# Patient Record
Sex: FEMALE | Race: ASIAN | NOT HISPANIC OR LATINO | Employment: FULL TIME | URBAN - METROPOLITAN AREA
[De-identification: names, ages, dates, MRNs, and addresses within clinical notes are randomized per-mention and may not be internally consistent; named-entity substitution may affect disease eponyms.]

---

## 2018-02-23 ENCOUNTER — OFFICE VISIT CONVERTED (OUTPATIENT)
Dept: FAMILY MEDICINE CLINIC | Facility: CLINIC | Age: 38
End: 2018-02-23
Attending: NURSE PRACTITIONER

## 2018-03-09 ENCOUNTER — OFFICE VISIT CONVERTED (OUTPATIENT)
Dept: SURGERY | Facility: CLINIC | Age: 38
End: 2018-03-09
Attending: SURGERY

## 2018-04-24 ENCOUNTER — OFFICE VISIT CONVERTED (OUTPATIENT)
Dept: FAMILY MEDICINE CLINIC | Facility: CLINIC | Age: 38
End: 2018-04-24
Attending: NURSE PRACTITIONER

## 2019-02-05 ENCOUNTER — OFFICE VISIT CONVERTED (OUTPATIENT)
Dept: FAMILY MEDICINE CLINIC | Facility: CLINIC | Age: 39
End: 2019-02-05
Attending: NURSE PRACTITIONER

## 2019-02-05 ENCOUNTER — HOSPITAL ENCOUNTER (OUTPATIENT)
Dept: FAMILY MEDICINE CLINIC | Facility: CLINIC | Age: 39
Discharge: HOME | End: 2019-02-05

## 2019-02-05 LAB
ALBUMIN SERPL-MCNC: 4.3 G/DL (ref 3.5–5)
ALBUMIN/GLOB SERPL: 1.5 {RATIO} (ref 1.4–2.6)
ALP SERPL-CCNC: 40 U/L (ref 42–98)
ALT SERPL-CCNC: 6 U/L (ref 10–40)
ANION GAP SERPL CALC-SCNC: 16 MMOL/L (ref 8–19)
APPEARANCE UR: CLEAR
AST SERPL-CCNC: 13 U/L (ref 15–50)
BASOPHILS # BLD AUTO: 0.07 10*3/UL (ref 0–0.2)
BASOPHILS NFR BLD AUTO: 1.19 % (ref 0–3)
BILIRUB SERPL-MCNC: 0.43 MG/DL (ref 0.2–1.3)
BILIRUB UR QL: NEGATIVE
BUN SERPL-MCNC: 7 MG/DL (ref 5–25)
BUN/CREAT SERPL: 13 {RATIO} (ref 6–20)
CALCIUM SERPL-MCNC: 9.1 MG/DL (ref 8.7–10.4)
CHLORIDE SERPL-SCNC: 106 MMOL/L (ref 99–111)
COLOR UR: YELLOW
CONV BACTERIA: ABNORMAL
CONV CO2: 24 MMOL/L (ref 22–32)
CONV COLLECTION SOURCE (UA): ABNORMAL
CONV HYALINE CASTS IN URINE MICRO: ABNORMAL /[LPF]
CONV TOTAL PROTEIN: 7.2 G/DL (ref 6.3–8.2)
CONV UROBILINOGEN IN URINE BY AUTOMATED TEST STRIP: 0.2 {EHRLICHU}/DL (ref 0.1–1)
CREAT UR-MCNC: 0.52 MG/DL (ref 0.5–0.9)
EOSINOPHIL # BLD AUTO: 0.4 10*3/UL (ref 0–0.7)
EOSINOPHIL # BLD AUTO: 7.01 % (ref 0–7)
ERYTHROCYTE [DISTWIDTH] IN BLOOD BY AUTOMATED COUNT: 13.3 % (ref 11.5–14.5)
GFR SERPLBLD BASED ON 1.73 SQ M-ARVRAT: >60 ML/MIN/{1.73_M2}
GLOBULIN UR ELPH-MCNC: 2.9 G/DL (ref 2–3.5)
GLUCOSE SERPL-MCNC: 64 MG/DL (ref 65–99)
GLUCOSE UR QL: NEGATIVE MG/DL
HBA1C MFR BLD: 11.2 G/DL (ref 12–16)
HCT VFR BLD AUTO: 33.9 % (ref 37–47)
HGB UR QL STRIP: NEGATIVE
KETONES UR QL STRIP: NEGATIVE MG/DL
LEUKOCYTE ESTERASE UR QL STRIP: ABNORMAL
LYMPHOCYTES # BLD AUTO: 1.84 10*3/UL (ref 1–5)
MCH RBC QN AUTO: 27.8 PG (ref 27–31)
MCHC RBC AUTO-ENTMCNC: 33 G/DL (ref 33–37)
MCV RBC AUTO: 84.1 FL (ref 81–99)
MONOCYTES # BLD AUTO: 0.5 10*3/UL (ref 0.2–1.2)
MONOCYTES NFR BLD AUTO: 8.7 % (ref 3–10)
NEUTROPHILS # BLD AUTO: 2.94 10*3/UL (ref 2–8)
NEUTROPHILS NFR BLD AUTO: 51.1 % (ref 30–85)
NITRITE UR QL STRIP: NEGATIVE
NRBC BLD AUTO-RTO: 0 % (ref 0–0.01)
OSMOLALITY SERPL CALC.SUM OF ELEC: 290 MOSM/KG (ref 273–304)
PH UR STRIP.AUTO: 5.5 [PH] (ref 5–8)
PLATELET # BLD AUTO: 249 10*3/UL (ref 130–400)
PMV BLD AUTO: 8.2 FL (ref 7.4–10.4)
POTASSIUM SERPL-SCNC: 4.3 MMOL/L (ref 3.5–5.3)
PROT UR QL: NEGATIVE MG/DL
RBC # BLD AUTO: 4.03 10*6/UL (ref 4.2–5.4)
RBC #/AREA URNS HPF: ABNORMAL /[HPF]
SODIUM SERPL-SCNC: 142 MMOL/L (ref 135–147)
SP GR UR: 1.02 (ref 1–1.03)
SQUAMOUS SPT QL MICRO: ABNORMAL /[HPF]
VARIANT LYMPHS NFR BLD MANUAL: 32 % (ref 20–45)
WBC # BLD AUTO: 5.76 10*3/UL (ref 4.8–10.8)
WBC #/AREA URNS HPF: ABNORMAL /[HPF]

## 2019-02-07 LAB — BACTERIA UR CULT: NORMAL

## 2019-02-20 ENCOUNTER — HOSPITAL ENCOUNTER (OUTPATIENT)
Dept: OTHER | Facility: HOSPITAL | Age: 39
Discharge: HOME | End: 2019-02-20

## 2019-03-14 ENCOUNTER — HOSPITAL ENCOUNTER (OUTPATIENT)
Dept: CT IMAGING | Facility: HOSPITAL | Age: 39
Discharge: HOME | End: 2019-03-14

## 2019-06-04 ENCOUNTER — HOSPITAL ENCOUNTER (OUTPATIENT)
Dept: MAMMOGRAPHY | Facility: HOSPITAL | Age: 39
Discharge: HOME | End: 2019-06-04
Attending: OBSTETRICS & GYNECOLOGY

## 2019-06-12 ENCOUNTER — HOSPITAL ENCOUNTER (OUTPATIENT)
Dept: URGENT CARE | Facility: CLINIC | Age: 39
Discharge: HOME | End: 2019-06-12

## 2019-07-11 ENCOUNTER — OFFICE VISIT CONVERTED (OUTPATIENT)
Dept: FAMILY MEDICINE CLINIC | Facility: CLINIC | Age: 39
End: 2019-07-11
Attending: FAMILY MEDICINE

## 2019-07-24 ENCOUNTER — HOSPITAL ENCOUNTER (OUTPATIENT)
Dept: CT IMAGING | Facility: HOSPITAL | Age: 39
Discharge: HOME | End: 2019-07-24
Attending: FAMILY MEDICINE

## 2019-07-25 ENCOUNTER — OFFICE VISIT CONVERTED (OUTPATIENT)
Dept: FAMILY MEDICINE CLINIC | Facility: CLINIC | Age: 39
End: 2019-07-25
Attending: FAMILY MEDICINE

## 2019-08-14 ENCOUNTER — OFFICE VISIT CONVERTED (OUTPATIENT)
Dept: FAMILY MEDICINE CLINIC | Facility: CLINIC | Age: 39
End: 2019-08-14
Attending: FAMILY MEDICINE

## 2019-08-26 ENCOUNTER — HOSPITAL ENCOUNTER (OUTPATIENT)
Dept: OTHER | Facility: HOSPITAL | Age: 39
Setting detail: RECURRING SERIES
Discharge: HOME | End: 2019-10-24
Attending: FAMILY MEDICINE

## 2019-09-11 ENCOUNTER — OFFICE VISIT CONVERTED (OUTPATIENT)
Dept: FAMILY MEDICINE CLINIC | Facility: CLINIC | Age: 39
End: 2019-09-11
Attending: FAMILY MEDICINE

## 2019-09-25 ENCOUNTER — OFFICE VISIT CONVERTED (OUTPATIENT)
Dept: FAMILY MEDICINE CLINIC | Facility: CLINIC | Age: 39
End: 2019-09-25
Attending: FAMILY MEDICINE

## 2019-10-10 ENCOUNTER — OFFICE VISIT CONVERTED (OUTPATIENT)
Dept: FAMILY MEDICINE CLINIC | Facility: CLINIC | Age: 39
End: 2019-10-10
Attending: FAMILY MEDICINE

## 2019-10-22 ENCOUNTER — OFFICE VISIT CONVERTED (OUTPATIENT)
Dept: FAMILY MEDICINE CLINIC | Facility: CLINIC | Age: 39
End: 2019-10-22
Attending: FAMILY MEDICINE

## 2019-11-12 ENCOUNTER — OFFICE VISIT CONVERTED (OUTPATIENT)
Dept: FAMILY MEDICINE CLINIC | Facility: CLINIC | Age: 39
End: 2019-11-12
Attending: FAMILY MEDICINE

## 2019-12-16 ENCOUNTER — HOSPITAL ENCOUNTER (OUTPATIENT)
Dept: URGENT CARE | Facility: CLINIC | Age: 39
Discharge: HOME | End: 2019-12-16
Attending: EMERGENCY MEDICINE

## 2019-12-17 ENCOUNTER — OFFICE VISIT CONVERTED (OUTPATIENT)
Dept: FAMILY MEDICINE CLINIC | Facility: CLINIC | Age: 39
End: 2019-12-17
Attending: FAMILY MEDICINE

## 2020-01-24 ENCOUNTER — HOSPITAL ENCOUNTER (OUTPATIENT)
Dept: MRI IMAGING | Facility: HOSPITAL | Age: 40
Discharge: HOME | End: 2020-01-24
Attending: FAMILY MEDICINE

## 2020-02-05 ENCOUNTER — OFFICE VISIT CONVERTED (OUTPATIENT)
Dept: SURGERY | Facility: CLINIC | Age: 40
End: 2020-02-05
Attending: SURGERY

## 2020-02-19 ENCOUNTER — OFFICE VISIT CONVERTED (OUTPATIENT)
Dept: FAMILY MEDICINE CLINIC | Facility: CLINIC | Age: 40
End: 2020-02-19
Attending: FAMILY MEDICINE

## 2020-03-26 ENCOUNTER — HOSPITAL ENCOUNTER (OUTPATIENT)
Dept: URGENT CARE | Facility: CLINIC | Age: 40
Discharge: HOME | End: 2020-03-26
Attending: EMERGENCY MEDICINE

## 2020-03-28 LAB — BACTERIA SPEC AEROBE CULT: NORMAL

## 2020-06-16 ENCOUNTER — OFFICE VISIT CONVERTED (OUTPATIENT)
Dept: FAMILY MEDICINE CLINIC | Facility: CLINIC | Age: 40
End: 2020-06-16
Attending: FAMILY MEDICINE

## 2021-01-20 ENCOUNTER — HOSPITAL ENCOUNTER (OUTPATIENT)
Dept: URGENT CARE | Facility: CLINIC | Age: 41
Discharge: HOME | End: 2021-01-20
Attending: PHYSICIAN ASSISTANT

## 2021-01-21 LAB — SARS-COV-2 RNA SPEC QL NAA+PROBE: DETECTED

## 2021-02-01 ENCOUNTER — HOSPITAL ENCOUNTER (OUTPATIENT)
Dept: URGENT CARE | Facility: CLINIC | Age: 41
Discharge: HOME | End: 2021-02-01
Attending: EMERGENCY MEDICINE

## 2021-02-03 LAB — SARS-COV-2 RNA SPEC QL NAA+PROBE: NOT DETECTED

## 2021-05-13 NOTE — PROGRESS NOTES
Progress Note      Patient Name: Niecy Hooks   Patient ID: [id number]   Sex: Female   YOB: 1980    Primary Care Provider: Shawn Cheung DO   Referring Provider: Kimberly Rondon DO    Visit Date: 2020    Provider: Shawn Cheung DO   Location: [facility]   Location Address: [address] [place] [address], [address] [place] [address]  [id number]   Location Phone: [phone]          Chief Complaint  · rash      History Of Present Illness  Niecy Hooks is a 39 year old  female who presents for evaluation and treatment of:      Patient presents today for an acute visit.  She reports a rash that is been present that comes and goes that appears to be at any part of her body.  She reports that sometimes scratching she will noticed the rash more.  She does have photos that shows an erythematous raised plaque-like rash.  She denies any new detergents or any new products that could be causing this that she is applying to her skin.  She does use a fragrant lotion.  She denies any other symptoms.  Denies any fever chills.  She was concerned about COVID-19 so she got tested which was negative per patient report.       Past Medical History  *No Pertinent Past Medical History         Past Surgical History  Breast enlargement; Gallbladder; Thyroid surgery         Medication List  amitriptyline 50 mg oral tablet; Voltaren 1 % topical gel         Allergy List  NO KNOWN DRUG ALLERGIES         Family Medical History  Cancer, Unspecified         Reproductive History   2 Para 2 0 0 0       Social History  Alcohol (Never); Exercises regularly; Tobacco (Never)         Immunizations  Name Date Admin   Influenza          Review of Systems     General: Denies any fever, chills, weight changes, or night sweats  HEENT:  Denies any vision or hearing changes. Denies any neck tenderness. Denies any headaches. Denies nasal congestion  Rash as discussed above.  Respiratory: Denies any shortness of breath  "      Vitals  Date Time BP Position Site L\R Cuff Size HR RR TEMP (F) WT  HT  BMI kg/m2 BSA m2 O2 Sat HC       06/16/2020 04:27 /88 Sitting    89 - R  97.5 149lbs 2oz 5'  8\" 22.67 1.8 99 %          Physical Examination     General: AAO 3, no acute distress, pleasant  HEENT: Normocephalic, atraumatic  Cardiovascular: Regular rate and rhythm without appreciable murmur  Respiratory: Clear to auscultation bilaterally no RRW  Gastrointestinal: Soft nontender nondistended with bowel sounds present  Skin: Patient has scattered areas of erythematous raised plaque-like lesions noted on both arms.  They are less at this time compared to the photo she is showing me.  extremities: No clubbing, cyanosis or edema  Neurologic: CN II through XII grossly intact   Psychiatric: Normal mood and affect           Assessment  · Urticaria     708.9/L50.9    Problems Reconciled  Plan  · Orders  o ACO-39: Current medications updated and reviewed () - - 06/16/2020  o ALLERGY CONSULTATION (SHERING) - 708.9/L50.9 - 06/16/2020   new onset. please evaluate and treat  · Medications  o Claritin 10 mg oral tablet   SIG: take 1 tablet (10 mg) by oral route once daily for 90 days   DISP: (90) tablets with 1 refills  Prescribed on 06/16/2020     o famotidine 40 mg oral tablet   SIG: take 1 tablet (40 mg) by oral route daily   DISP: (90) tablets with 1 refills  Prescribed on 06/16/2020     o Medrol (Angel) 4 mg oral tablets,dose pack   SIG: take by oral route as directed per package instructions for 6 days   DISP: (1) 21 ct dose-pack with 0 refills  Prescribed on 06/16/2020     o Medications have been Reconciled  o Transition of Care or Provider Policy  · Instructions  o Patient was educated/instructed on their diagnosis, treatment and medications prior to discharge from the clinic today.  o Patient instructed to seek medical attention urgently for new or worsening symptoms.  o Call the office with any concerns or questions.  o I suspect " urticaria. I have discussed starting patient on Claritin, famotidine as well as a Medrol Dosepak. I will set her up with allergy to be evaluated for contact allergens. Patient to call with any questions or concerns. She is to return if there is any worsening symptoms or no improvement.  · Disposition  o Follow Up in 2 months.            Electronically Signed by: Shawn Cheung DO -Author on June 16, 2020 06:06:20 PM

## 2021-05-15 VITALS
TEMPERATURE: 98.7 F | DIASTOLIC BLOOD PRESSURE: 64 MMHG | HEIGHT: 68 IN | HEART RATE: 72 BPM | OXYGEN SATURATION: 98 % | SYSTOLIC BLOOD PRESSURE: 118 MMHG | BODY MASS INDEX: 21.67 KG/M2 | WEIGHT: 143 LBS

## 2021-05-15 VITALS
RESPIRATION RATE: 16 BRPM | SYSTOLIC BLOOD PRESSURE: 120 MMHG | HEIGHT: 68 IN | HEART RATE: 74 BPM | WEIGHT: 142.25 LBS | DIASTOLIC BLOOD PRESSURE: 80 MMHG | TEMPERATURE: 98 F | OXYGEN SATURATION: 99 % | BODY MASS INDEX: 21.56 KG/M2

## 2021-05-15 VITALS
WEIGHT: 139 LBS | TEMPERATURE: 98.1 F | DIASTOLIC BLOOD PRESSURE: 74 MMHG | BODY MASS INDEX: 21.07 KG/M2 | HEART RATE: 68 BPM | OXYGEN SATURATION: 98 % | SYSTOLIC BLOOD PRESSURE: 122 MMHG | HEIGHT: 68 IN

## 2021-05-15 VITALS
OXYGEN SATURATION: 99 % | BODY MASS INDEX: 21.14 KG/M2 | TEMPERATURE: 98 F | SYSTOLIC BLOOD PRESSURE: 110 MMHG | HEIGHT: 68 IN | HEART RATE: 69 BPM | DIASTOLIC BLOOD PRESSURE: 62 MMHG | WEIGHT: 139.5 LBS

## 2021-05-15 VITALS
OXYGEN SATURATION: 99 % | DIASTOLIC BLOOD PRESSURE: 74 MMHG | BODY MASS INDEX: 20.99 KG/M2 | SYSTOLIC BLOOD PRESSURE: 118 MMHG | HEART RATE: 98 BPM | WEIGHT: 138.5 LBS | HEIGHT: 68 IN | TEMPERATURE: 99 F

## 2021-05-15 VITALS
HEIGHT: 68 IN | SYSTOLIC BLOOD PRESSURE: 114 MMHG | HEART RATE: 82 BPM | OXYGEN SATURATION: 99 % | TEMPERATURE: 97.8 F | WEIGHT: 144 LBS | DIASTOLIC BLOOD PRESSURE: 62 MMHG | BODY MASS INDEX: 21.82 KG/M2

## 2021-05-15 VITALS
OXYGEN SATURATION: 99 % | BODY MASS INDEX: 22.6 KG/M2 | WEIGHT: 149.12 LBS | SYSTOLIC BLOOD PRESSURE: 134 MMHG | DIASTOLIC BLOOD PRESSURE: 88 MMHG | HEIGHT: 68 IN | HEART RATE: 89 BPM | TEMPERATURE: 97.5 F

## 2021-05-15 VITALS
WEIGHT: 139.12 LBS | HEART RATE: 86 BPM | DIASTOLIC BLOOD PRESSURE: 78 MMHG | SYSTOLIC BLOOD PRESSURE: 120 MMHG | TEMPERATURE: 98.2 F | BODY MASS INDEX: 21.08 KG/M2 | HEIGHT: 68 IN | OXYGEN SATURATION: 100 %

## 2021-05-15 VITALS
DIASTOLIC BLOOD PRESSURE: 78 MMHG | HEIGHT: 68 IN | SYSTOLIC BLOOD PRESSURE: 114 MMHG | BODY MASS INDEX: 21.98 KG/M2 | WEIGHT: 145 LBS | OXYGEN SATURATION: 98 % | TEMPERATURE: 98.3 F | HEART RATE: 96 BPM

## 2021-05-15 VITALS
SYSTOLIC BLOOD PRESSURE: 122 MMHG | DIASTOLIC BLOOD PRESSURE: 72 MMHG | TEMPERATURE: 98.1 F | HEART RATE: 72 BPM | BODY MASS INDEX: 20.97 KG/M2 | HEIGHT: 68 IN | OXYGEN SATURATION: 100 % | WEIGHT: 138.37 LBS

## 2021-05-15 VITALS — RESPIRATION RATE: 16 BRPM | BODY MASS INDEX: 21.22 KG/M2 | HEIGHT: 68 IN | WEIGHT: 140 LBS

## 2021-05-15 VITALS
DIASTOLIC BLOOD PRESSURE: 68 MMHG | WEIGHT: 141.25 LBS | BODY MASS INDEX: 21.41 KG/M2 | OXYGEN SATURATION: 99 % | SYSTOLIC BLOOD PRESSURE: 102 MMHG | TEMPERATURE: 97.9 F | HEART RATE: 58 BPM | HEIGHT: 68 IN

## 2021-05-15 VITALS
HEART RATE: 82 BPM | HEIGHT: 68 IN | OXYGEN SATURATION: 100 % | BODY MASS INDEX: 21.52 KG/M2 | SYSTOLIC BLOOD PRESSURE: 116 MMHG | TEMPERATURE: 98.7 F | WEIGHT: 142 LBS | DIASTOLIC BLOOD PRESSURE: 72 MMHG

## 2021-05-16 VITALS — WEIGHT: 145 LBS | HEIGHT: 68 IN | BODY MASS INDEX: 21.98 KG/M2 | RESPIRATION RATE: 16 BRPM

## 2021-05-16 VITALS
WEIGHT: 145 LBS | RESPIRATION RATE: 16 BRPM | OXYGEN SATURATION: 98 % | BODY MASS INDEX: 21.98 KG/M2 | HEIGHT: 68 IN | HEART RATE: 68 BPM | DIASTOLIC BLOOD PRESSURE: 69 MMHG | SYSTOLIC BLOOD PRESSURE: 121 MMHG

## 2021-05-16 VITALS
WEIGHT: 143.5 LBS | DIASTOLIC BLOOD PRESSURE: 70 MMHG | HEART RATE: 66 BPM | BODY MASS INDEX: 21.75 KG/M2 | TEMPERATURE: 98.3 F | OXYGEN SATURATION: 100 % | SYSTOLIC BLOOD PRESSURE: 120 MMHG | HEIGHT: 68 IN

## 2022-02-18 PROCEDURE — U0004 COV-19 TEST NON-CDC HGH THRU: HCPCS | Performed by: FAMILY MEDICINE

## 2022-05-28 PROCEDURE — U0004 COV-19 TEST NON-CDC HGH THRU: HCPCS | Performed by: PHYSICIAN ASSISTANT

## 2022-08-19 PROCEDURE — 87086 URINE CULTURE/COLONY COUNT: CPT

## 2022-10-20 ENCOUNTER — OFFICE VISIT (OUTPATIENT)
Dept: ORTHOPEDIC SURGERY | Facility: CLINIC | Age: 42
End: 2022-10-20

## 2022-10-20 VITALS — BODY MASS INDEX: 22.07 KG/M2 | TEMPERATURE: 97.8 F | RESPIRATION RATE: 14 BRPM | HEIGHT: 69 IN | WEIGHT: 149 LBS

## 2022-10-20 DIAGNOSIS — M72.2 PLANTAR FASCIITIS: Primary | ICD-10-CM

## 2022-10-20 PROCEDURE — 73630 X-RAY EXAM OF FOOT: CPT | Performed by: ORTHOPAEDIC SURGERY

## 2022-10-20 PROCEDURE — 99203 OFFICE O/P NEW LOW 30 MIN: CPT | Performed by: ORTHOPAEDIC SURGERY

## 2022-10-20 NOTE — PROGRESS NOTES
"  ORTHOPEDIC  NOTE         REQUESTING PHYSICIAN    No att. providers found     PRIMARY CARE PHYSICIAN    Provider, No Known    REASON FOR CONSULTATION    Ann Marie Hooks is a 42 y.o. female who was referred for consultation for   Chief Complaint   Patient presents with   • Right Foot - Pain, Initial Evaluation       HPI    Ann Marie Hooks is a pleasant 42 y.o. female who presents today for evaluation of a right heel pain. She has had difficulty bearing weight on the foot for approximately 12weeks. They state that it feels like \"a hot poker\" in their heel. It is significantly worse the first thing in the morning. A heating pad and stretching seem to really help but she is continue to try some YouTube exercises as well as ice bottle massage and some anti-inflammatories as well as some recent steroids given by urgent care but continues to be symptomatic.. Denies other injuries.       REVIEW OF SYSTEMS    See HPI for further details.   ROS:  Constitutional:  Denies fever, shaking or chills         All other systems reviewed and are negative.      MEDICAL HISTORY    Past Medical History:   Diagnosis Date   • Injury of back    • Sciatic nerve pain        SURGICAL HISTORY     has a past surgical history that includes Thyroid surgery; Cholecystectomy; and Breast surgery.    FAMILY HISTORY    Family History   Problem Relation Age of Onset   • Diabetes Paternal Grandfather        SOCIAL HISTORY    Social History     Socioeconomic History   • Marital status:    Tobacco Use   • Smoking status: Never   • Smokeless tobacco: Never   Vaping Use   • Vaping Use: Never used   Substance and Sexual Activity   • Alcohol use: Yes     Comment: occasion   • Drug use: Never   • Sexual activity: Defer       ALLERGIES    No Known Allergies    MEDICATIONS    Current Outpatient Medications   Medication Sig Dispense Refill   • diclofenac (VOLTAREN) 75 MG EC tablet Take 1 tablet by mouth 2 (Two) Times a Day. 30 tablet 0   • predniSONE " "(DELTASONE) 20 MG tablet Take 2 tablets by mouth Daily. 10 tablet 0     No current facility-administered medications for this visit.       PHYSICAL EXAM          Physical Exam:   Vital Signs:  Temp 97.8 °F (36.6 °C)   Resp 14   Ht 175.3 cm (69\")   Wt 67.6 kg (149 lb)   LMP 09/20/2022   BMI 22.00 kg/m²   Constitutional: Awake alert and oriented x3, well developed, well nourished, no acute distress, non-toxic appearance.      Musculoskeletal exam::    Exam of the right ankle and foot:  No atrophy, swelling, erythema, ecchymosis, or gross deformity noted  + tenderness to palpation over the plantar fascia insertion  Heel compression negative, no tenderness of achilles tendon  + flexible pes planus  Full range of motion  5/5 strength in ankle dorsiflexion, plantarflexion, inversion, and eversion  Ankle stable to anterior drawer testing  Talar tilt negative  No pain with passive inversion or eversion  Sensation grossly intact to light touch throughout  Skin intact  Palpable distal pulses    Exam of the contralateral ankle and foot is normal:  No atrophy, swelling, erythema, ecchymosis, or gross deformity noted  No tenderness to palpation or crepitus  Full range of motion  5/5 strength in ankle dorsiflexion, plantarflexion, inversion, and eversion  Ankle stable to anterior drawer testing  Talar tilt negative  No pain with passive inversion or eversion  Sensation grossly intact to light touch throughout  Skin intact  Palpable distal pulses          Diagnostic Studies:     Imaging was personally and individually reviewed and discussed at length with the patient:    3 views of the left foot were taken in the office today. There are no fractures or subluxations seen.  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  I have recommended a stretching " protocol, which was demonstrated at length in the office and a handout was supplied, for initial treatment.  I have also recommended an NSAID to help with the swelling and inflammation.  She also given a night splint as well as therapeutic orders.  Things or not improving in the next 4 to 6 weeks she is instructed to return recommend following my foot ankle partner if needed.        All questions were answered, the patient understands and agrees with the plan.

## 2022-11-22 ENCOUNTER — TREATMENT (OUTPATIENT)
Dept: PHYSICAL THERAPY | Facility: CLINIC | Age: 42
End: 2022-11-22

## 2022-11-22 DIAGNOSIS — R26.2 DIFFICULTY WALKING: ICD-10-CM

## 2022-11-22 DIAGNOSIS — M72.2 PLANTAR FASCIITIS OF RIGHT FOOT: Primary | ICD-10-CM

## 2022-11-22 DIAGNOSIS — M79.671 RIGHT FOOT PAIN: ICD-10-CM

## 2022-11-22 PROCEDURE — 97161 PT EVAL LOW COMPLEX 20 MIN: CPT | Performed by: PHYSICAL THERAPIST

## 2022-11-22 PROCEDURE — 97140 MANUAL THERAPY 1/> REGIONS: CPT | Performed by: PHYSICAL THERAPIST

## 2022-11-22 PROCEDURE — 97110 THERAPEUTIC EXERCISES: CPT | Performed by: PHYSICAL THERAPIST

## 2022-11-22 NOTE — PROGRESS NOTES
Physical Therapy Initial Evaluation and Plan of Care  [address] [facility], [address] [place] [address]        Patient: Ann Marie Hooks   : 1980  Diagnosis/ICD-10 Code:  Plantar fasciitis of right foot [M72.2]  Referring practitioner: Shawn Aguilar MD  Date of Initial Visit: 2022  Today's Date: 2022  Patient seen for 1 sessions           Subjective Questionnaire: LEFS: 47/80      Subjective Evaluation    History of Present Illness  Mechanism of injury: Pt reports that she has had R plantar fasciitis for several months with no particular DAYSI.  Pt reports that she has to walk long distances for work and has to wear steel toe boots for one job.  Pt reports that she has been fitted for orthotics which she wears at work and in her sneakers as well.  Pt reports that pain is worst in the morning and with long distance walking.  Pt reports that she is taking medication for pain which is helping some.  Pt reports work 12 hour shifts.     Pain  Current pain ratin  At best pain ratin  At worst pain rating: 10  Quality: sharp, tight, knife-like and discomfort  Relieving factors: change in position, rest and medications  Aggravating factors: ambulation, prolonged positioning, movement and standing  Progression: worsening    Patient Goals  Patient goals for therapy: decreased pain, increased motion, increased strength, independence with ADLs/IADLs and return to sport/leisure activities             Objective          Tenderness   Left Ankle/Foot   Tenderness in the plantar fascia.     Right Ankle/Foot   Tenderness in the plantar fascia.     Additional Tenderness Details  significant tightness of R plantar fascia noted, mild tightness in L plantar fascia    Active Range of Motion   Left Ankle/Foot   Dorsiflexion (ke): 8 degrees   Plantar flexion: WFL  Inversion: WFL  Eversion: WFL    Right Ankle/Foot   Dorsiflexion (ke): 3 degrees   Plantar flexion: WFL  Inversion: WFL  Eversion: WFL    Strength/Myotome  Testing     Left Hip   Planes of Motion   Flexion: 4+  Extension: 4-    Right Hip   Planes of Motion   Flexion: 4+  Extension: 4-    Left Knee   Flexion: 4+  Extension: 4+    Right Knee   Flexion: 4+  Extension: 4+    Left Ankle/Foot   Dorsiflexion: 4  Plantar flexion: 4-  Inversion: 4  Eversion: 4    Right Ankle/Foot   Dorsiflexion: 4  Plantar flexion: 4-  Inversion: 4  Eversion: 4    Tests   Left Ankle/Foot   Negative for windlass.     Right Ankle/Foot   Positive for windlass.     Ambulation     Observational Gait   Gait: antalgic   Walking speed and stride length within functional limits.   Base of support: normal    Additional Observational Gait Details  Pt demonstrates mild antalgic gait on the R LE with decreased stance time, heel strike and push off.      General Comments     Ankle/Foot Comments   Light touch sensation normal in bilateral LEs           Assessment & Plan     Assessment  Impairments: abnormal gait, abnormal muscle firing, abnormal or restricted ROM, activity intolerance, impaired balance, impaired physical strength, lacks appropriate home exercise program, pain with function, safety issue and weight-bearing intolerance  Functional Limitations: walking, uncomfortable because of pain, standing and unable to perform repetitive tasks  Assessment details: Patient presents with signs/symptoms consistent with right plantar fasciitis including decreased bilateral ankle DF ROM, bilateral hip and ankle weakness, plantar fascia tightness bilaterally (worse on R), positive windlass test and reports of pain limiting function during ADLs as shown on the LEFS.  Patient would benefit from skilled PT services in order to address deficits limiting function at this time.  HEP was given to patient this session and education on HEP/diagnosis provided to patient.      Prognosis: good    Goals  Plan Goals: 1. The patient has limited ROM for the R ankle.   LTG 1: 12 weeks:  In order to allow the patient greater ease  with forward, lateral, and diagonal mobility the patient will demonstrate improved ROM of the bilateral ankle as follows:  10 degrees of dorsiflexion.  STATUS:  New    2. The patient has limited strength of the R ankle.   LTG 2: 12 weeks:  In order to provide greater joint stability of the bilateral ankle the patient will demonstrate improved strength of the bilateral ankles as follows:  5/5 dorsiflexion, 5/5 inversion, 5/5 eversion, and 4+/5 plantar flexion.    STATUS:  New   STG 2a: 6 weeks:  The patient will demonstrate improved strength of the bilateral ankle as follows:  4+/5 dorsiflexion, 4+/5 inversion, 4+/5 eversion, and 4/5 plantar flexion.    STATUS:  New    3. The patient has gait dysfunction.   LTG 3: 12 weeks:  The patient will ambulate without assistive device, independently, for community distances with minimal limp to the R lower extremity in order to improve mobility and allow patient to perform activities such as grocery shopping with greater ease.    STATUS:  New   STG 3a: The patient will be independent in HEP.    STATUS:  New    4. The patient complains of R ankle pain.  LTG 4: 12 weeks:  The patient will report a pain rating of 1/10 or better in order to improve  tolerance to activities of daily living and improve sleep quality.  STATUS:  New  STG 4a: 6 weeks:  The patient will report a pain rating of 3/10 or better.  STATUS:  New    5. Mobility: Walking/Moving Around Functional Limitation     LTG 5: 12 weeks:  The patient will demonstrate 1-19% limitation by achieving a score of 70 on the Lower Extremity Functional Scale.    STATUS:  New   STG 5a: 6 weeks:  The patient will demonstrate 25% limitation by achieving a score of 60 on the Lower Extremity Functional Scale.      STATUS:  New        Plan  Therapy options: will be seen for skilled therapy services  Planned modality interventions: cryotherapy, TENS and thermotherapy (hydrocollator packs)  Planned therapy interventions:  balance/weight-bearing training, ADL retraining, soft tissue mobilization, strengthening, stretching, therapeutic activities, joint mobilization, home exercise program, gait training, functional ROM exercises, flexibility, body mechanics training, postural training, neuromuscular re-education and manual therapy  Frequency: 2x week  Duration in weeks: 12  Treatment plan discussed with: patient        Timed:         Manual Therapy:    8     mins  31476;     Therapeutic Exercise:    8     mins  21699;     Neuromuscular Cuco:    0    mins  36985;    Therapeutic Activity:     0     mins  76169;     Gait Trainin     mins  05997;     Ultrasound:     0     mins  49270;    Ionto                               0    mins   85582  Self-care  __0__ mins 29513    Un-Timed:  Electrical Stimulation:    0     mins  99397 ( );  Traction     0     mins 75501  Low Eval     20     Mins  69134  Mod Eval     0     Mins  56883  High Eval                       0     Mins  88731  Hot pack     0     Mins    Cold pack                       0     Mins      Timed Treatment:   16   mins   Total Treatment:     36   mins    PT SIGNATURE: Courtney Sepulveda PT      Electronically signed 2022  [place] License: [id number]    Initial Certification    Certification Period: 2022 thru 2023  NPI: [id number]  I certify that the therapy services are furnished while this patient is under my care.  The services outlined above are required by this patient, and will be reviewed every 90 days.     PHYSICIAN: Shawn Aguilar MD      DATE:     Please sign and return via fax to [phone].. Thank you, [facility].

## 2022-12-02 ENCOUNTER — TREATMENT (OUTPATIENT)
Dept: PHYSICAL THERAPY | Facility: CLINIC | Age: 42
End: 2022-12-02

## 2022-12-02 DIAGNOSIS — R26.2 DIFFICULTY WALKING: ICD-10-CM

## 2022-12-02 DIAGNOSIS — M72.2 PLANTAR FASCIITIS OF RIGHT FOOT: Primary | ICD-10-CM

## 2022-12-02 DIAGNOSIS — M79.671 RIGHT FOOT PAIN: ICD-10-CM

## 2022-12-02 PROCEDURE — 97140 MANUAL THERAPY 1/> REGIONS: CPT | Performed by: PHYSICAL THERAPIST

## 2022-12-02 PROCEDURE — 97110 THERAPEUTIC EXERCISES: CPT | Performed by: PHYSICAL THERAPIST

## 2022-12-02 PROCEDURE — 97530 THERAPEUTIC ACTIVITIES: CPT | Performed by: PHYSICAL THERAPIST

## 2022-12-02 NOTE — PROGRESS NOTES
Physical Therapy Daily Treatment Note  [address], [address], [place] [address]      Patient: Ann Marie Hooks   : 1980  Diagnosis/ICD-10 Code:  Plantar fasciitis of right foot [M72.2]  Referring practitioner: Shawn Aguilar MD  Date of Initial Visit: Type: THERAPY  Noted: 2022  Today's Date: 2022  Patient seen for 2 sessions             Subjective   Ann Marie Hooks reports: moderate R PF pain today.    Objective   See Exercise, Manual, and Modality Logs for complete treatment.       Assessment/Plan  Patient tolerated all exercise progressions and manual therapy well today but continues to demonstrate R ankle strength deficits and R PF tightness limiting function. Continue to progress per patient tolerance.     Progress per Plan of Care           Timed:  Manual Therapy:    23     mins  86048;  Therapeutic Exercise:    8     mins  16530;     Neuromuscular Cuco:    0    mins  04959;    Therapeutic Activity:     8     mins  68322;     Gait Trainin     mins  26497;     Ultrasound:     0     mins  83251;    Electrical Stimulation:    0     mins  33298;  Iontophoresis     0     mins  57110    Untimed:  Electrical Stimulation:    0     mins  78541 ( );  Mechanical Traction:    0     mins  18697;   Fluidotherapy     0     mins  79168  Hot pack     0     Mins    Cold pack                       0     Mins     Timed Treatment:   39   mins   Total Treatment:     39   mins        Courtney Sepulveda PT    Electronically signed [unfilled]  [place] License: [id number]  NPI number: [id number]

## 2022-12-08 ENCOUNTER — TREATMENT (OUTPATIENT)
Dept: PHYSICAL THERAPY | Facility: CLINIC | Age: 42
End: 2022-12-08

## 2022-12-08 DIAGNOSIS — M72.2 PLANTAR FASCIITIS OF RIGHT FOOT: Primary | ICD-10-CM

## 2022-12-08 DIAGNOSIS — M79.671 RIGHT FOOT PAIN: ICD-10-CM

## 2022-12-08 DIAGNOSIS — R26.2 DIFFICULTY WALKING: ICD-10-CM

## 2022-12-08 PROCEDURE — 97530 THERAPEUTIC ACTIVITIES: CPT | Performed by: PHYSICAL THERAPIST

## 2022-12-08 PROCEDURE — 97140 MANUAL THERAPY 1/> REGIONS: CPT | Performed by: PHYSICAL THERAPIST

## 2022-12-08 PROCEDURE — 97110 THERAPEUTIC EXERCISES: CPT | Performed by: PHYSICAL THERAPIST

## 2022-12-08 NOTE — PROGRESS NOTES
Physical Therapy Daily Treatment Note  [address], [address] [place] [address]        Patient: Ann Marie Hooks   : 1980  Diagnosis/ICD-10 Code:  Plantar fasciitis of right foot [M72.2]  Referring practitioner: Shawn Aguilar MD  Date of Initial Visit: Type: THERAPY  Noted: 2022  Today's Date: 2022  Patient seen for 3 sessions             Subjective   Ann Marie Hooks reports having 6/10 pain in her plantar surface of her right foot upon arrival today.    Objective       + Tightness/ Tenderness to palpation Right Medial Calcaneal region - along Plantar surface.    Right Ankle Strength:  Grossly 4-/5 - 4/5  Decreased eccentric control noted during resistive band exercise performance).    See Exercise and Manual Logs for complete treatment.       Assessment/Plan     Pt tolerated therapy session well - with progression of therapeutic exercises, CKC-Functional activities, and Manual therapy. She has improved, but continues to have deficits in  Right ankle/foot Soft tissue mobility,  Strength, and Stability; limiting function and ability to perform ADLs without pain at this time.   HEP updated with 4 way ankle  With red resistive band issued for use at home.  Written/Verbal instruction given.    Progress per Plan of Care           Timed:  Manual Therapy:    8     mins  35457;  Therapeutic Exercise:    23     mins  40186;     Neuromuscular Cuco:    0    mins  02875;    Therapeutic Activity:     8     mins  19457;     Gait Trainin     mins  42023;     Ultrasound:     0     mins  40475;    Electrical Stimulation:    0     mins  89808;  Iontophoresis     0     mins  71242    Untimed:  Electrical Stimulation:    0     mins  47815 (MC );  Mechanical Traction:    0     mins  20455;   Fluidotherapy     0     mins  06178  Hot pack     0     mins  53539  Cold pack     0     mins  25578    Timed Treatment:   39   mins   Total Treatment:     39   mins        Kia Barraza PTA  Physical Therapist  Assistant

## 2023-01-11 NOTE — PROGRESS NOTES
Chief Complaint  Establish Care (/), Plantar Fasciitis, Podiatry Referral , and skin irritation (Pt states her back has been itchy )    Subjective          Ann Marie Hooks presents to [facility]  Depression  Visit Type: initial  Patient presents with the following symptoms: depressed mood, excessive worry, feelings of hopelessness, irritability and nervousness/anxiety.  Patient is not experiencing: anhedonia, chest pain, choking sensation, compulsions, confusion, decreased concentration, dizziness, dry mouth, fatigue, feelings of worthlessness, hypersomnia, hyperventilation, impotence, insomnia, malaise, memory impairment, muscle tension, nausea, obsessions, palpitations, panic, psychomotor agitation, psychomotor retardation, restlessness, shortness of breath, suicidal ideas, suicidal planning, thoughts of death, weight gain and weight loss.  Exacerbated by:  overseas, struggling to be single mom. Has picked up second job just to be out of the house. Denies SI/HI.  Treatment tried: nothing      Rash  The affected locations include the chest. The rash is characterized by itchiness. Pertinent negatives include no anorexia, congestion, cough, diarrhea, eye pain, facial edema, fatigue, fever, joint pain, nail changes, rhinorrhea, shortness of breath, sore throat or vomiting.        Previous PCP:  Shawn Cheung DO  Specialist(s):   Flower Hendrix APRN [facility] [place]     COVID vaccine: states she is up to date - does not have card with her   TDAP: not up to date   Pneumonia vaccine: not of age  Shingles vaccine: not of age  Colon cancer screening: never  Mammogram: 6/4/2019- ordered; vasu espinoza @ [facility]   Pap Smear: 3/02/2023 apt with gyno for annual   DEXA/Bone Density: not of age      Plantar Fascitis:     Would like podiatry referral. Was getting injections.     Current Outpatient Medications   Medication Instructions   • triamcinolone  "(KENALOG) 0.1 % ointment 1 application, Topical, 2 Times Daily       The following portions of the patient's history were reviewed and updated as appropriate: allergies, current medications, past family history, past medical history, past social history, past surgical history, and problem list.    Objective   Vital Signs:   /76 (BP Location: Left arm, Patient Position: Sitting, Cuff Size: Adult)   Pulse 76   Temp 98 °F (36.7 °C) (Temporal)   Ht 175.3 cm (69\")   Wt 67.9 kg (149 lb 9.6 oz)   SpO2 100%   BMI 22.09 kg/m²     Wt Readings from Last 3 Encounters:   01/12/23 67.9 kg (149 lb 9.6 oz)   10/20/22 67.6 kg (149 lb)   10/15/22 65.8 kg (145 lb)     BP Readings from Last 3 Encounters:   01/12/23 115/76   11/21/22 125/80   10/15/22 108/74     Physical Exam  Skin:     Coloration: Skin is not jaundiced or pale.      Findings: Rash (chest) present. No bruising, erythema or lesion.        Appearance: No acute distress, well-nourished  Head: normocephalic, atraumatic  Eyes: extraocular movements intact, no scleral icterus, no conjunctival injection  Ears, Nose, and Throat: external ears normal, nares patent, moist mucous membranes  Cardiovascular: regular rate and rhythm. no murmurs, rubs, or gallops. no edema  Respiratory: breathing comfortably, symmetric chest rise, clear to auscultation bilaterally. No wheezes, rales, or rhonchi.  Neuro: alert and oriented to time, place, and person. Normal gait  Psych: normal mood and affect     Result Review :   The following data was reviewed by: BERNARD Douglas on 01/12/2023:  Common labs    Common Labs 1/12/23 1/12/23 1/12/23 1/12/23    1508 1508 1508 1508   Glucose  126 (A)     BUN  8     Creatinine  0.56 (A)     Sodium  138     Potassium  3.9     Chloride  102     Calcium  9.0     Albumin  4.6     Total Bilirubin  <0.2     Alkaline Phosphatase  62     AST (SGOT)  21     ALT (SGPT)  13     WBC 6.96      Hemoglobin 8.9 (A)      Hematocrit 30.3 (A)    "   Platelets 343      Total Cholesterol   150    Triglycerides   85    HDL Cholesterol   61 (A)    LDL Cholesterol    73    Hemoglobin A1C    5.20   (A) Abnormal value                   Lab Results   Component Value Date    SARSANTIGEN Not Detected 02/18/2022    COVID19 Not Detected 05/28/2022    RAPFLUA Negative 02/18/2022    RAPFLUB Negative 02/18/2022    RAPSCRN Negative 02/18/2022    BILIRUBINUR Negative 08/29/2022       Procedures        Assessment and Plan    Diagnoses and all orders for this visit:    1. Screening for lipid disorders (Primary)  -     Lipid Panel    2. Need for hepatitis C screening test  -     HCV Antibody Rfx To Qnt PCR  -     Interpretation:    3. Screening for thyroid disorder  -     TSH Rfx On Abnormal To Free T4    4. Plantar fasciitis  -     Ambulatory Referral to Podiatry    5. Annual physical exam  -     CBC & Differential  -     Comprehensive Metabolic Panel    6. Dermatitis  -     triamcinolone (KENALOG) 0.1 % ointment; Apply 1 application topically to the appropriate area as directed 2 (Two) Times a Day.  Dispense: 453.6 g; Refill: 0    7. Encounter for screening mammogram for malignant neoplasm of breast  -     Mammo Screening Digital Tomosynthesis Bilateral With CAD; Future    8. Encounter for immunization  -     Tdap Vaccine Greater Than or Equal To 8yo IM    9. Major depressive disorder, recurrent episode, mild degree (HCC)  Comments:  Nervous to start medications ; would like to trial genesight  Orders:  -     GeneSight - Swab,; Future    10. IFG (impaired fasting glucose)  -     Hemoglobin A1c; Future  -     Hemoglobin A1c    11. Anemia, unspecified type  -     Iron and TIBC; Future  -     Iron and TIBC      Advised on diet, physical activity, sunscreen, helmet, texting and driving, etc      There are no discontinued medications.       Follow Up   Return in about 1 year (around 1/12/2024) for Annual physical.  Patient was given instructions and counseling regarding her  condition or for health maintenance advice. Please see specific information pulled into the AVS if appropriate.       Rosaline Robertson, BERNARD  01/17/23  07:44 EST

## 2023-01-12 ENCOUNTER — OFFICE VISIT (OUTPATIENT)
Dept: INTERNAL MEDICINE | Facility: CLINIC | Age: 43
End: 2023-01-12
Payer: COMMERCIAL

## 2023-01-12 VITALS
WEIGHT: 149.6 LBS | HEART RATE: 76 BPM | DIASTOLIC BLOOD PRESSURE: 76 MMHG | OXYGEN SATURATION: 100 % | BODY MASS INDEX: 22.16 KG/M2 | SYSTOLIC BLOOD PRESSURE: 115 MMHG | TEMPERATURE: 98 F | HEIGHT: 69 IN

## 2023-01-12 DIAGNOSIS — Z13.29 SCREENING FOR THYROID DISORDER: ICD-10-CM

## 2023-01-12 DIAGNOSIS — D64.9 ANEMIA, UNSPECIFIED TYPE: ICD-10-CM

## 2023-01-12 DIAGNOSIS — R73.01 IFG (IMPAIRED FASTING GLUCOSE): ICD-10-CM

## 2023-01-12 DIAGNOSIS — Z00.00 ANNUAL PHYSICAL EXAM: ICD-10-CM

## 2023-01-12 DIAGNOSIS — L30.9 DERMATITIS: ICD-10-CM

## 2023-01-12 DIAGNOSIS — Z11.59 NEED FOR HEPATITIS C SCREENING TEST: ICD-10-CM

## 2023-01-12 DIAGNOSIS — Z13.220 SCREENING FOR LIPID DISORDERS: Primary | ICD-10-CM

## 2023-01-12 DIAGNOSIS — Z12.31 ENCOUNTER FOR SCREENING MAMMOGRAM FOR MALIGNANT NEOPLASM OF BREAST: ICD-10-CM

## 2023-01-12 DIAGNOSIS — M72.2 PLANTAR FASCIITIS: ICD-10-CM

## 2023-01-12 DIAGNOSIS — F33.0 MAJOR DEPRESSIVE DISORDER, RECURRENT EPISODE, MILD DEGREE: Chronic | ICD-10-CM

## 2023-01-12 DIAGNOSIS — Z23 ENCOUNTER FOR IMMUNIZATION: ICD-10-CM

## 2023-01-12 LAB
BASOPHILS # BLD AUTO: 0.06 10*3/MM3 (ref 0–0.2)
BASOPHILS NFR BLD AUTO: 0.9 % (ref 0–1.5)
DEPRECATED RDW RBC AUTO: 51 FL (ref 37–54)
EOSINOPHIL # BLD AUTO: 0.62 10*3/MM3 (ref 0–0.4)
EOSINOPHIL NFR BLD AUTO: 8.9 % (ref 0.3–6.2)
ERYTHROCYTE [DISTWIDTH] IN BLOOD BY AUTOMATED COUNT: 18.8 % (ref 12.3–15.4)
HCT VFR BLD AUTO: 30.3 % (ref 34–46.6)
HGB BLD-MCNC: 8.9 G/DL (ref 12–15.9)
IMM GRANULOCYTES # BLD AUTO: 0.02 10*3/MM3 (ref 0–0.05)
IMM GRANULOCYTES NFR BLD AUTO: 0.3 % (ref 0–0.5)
LYMPHOCYTES # BLD AUTO: 1.71 10*3/MM3 (ref 0.7–3.1)
LYMPHOCYTES NFR BLD AUTO: 24.6 % (ref 19.6–45.3)
MCH RBC QN AUTO: 22.2 PG (ref 26.6–33)
MCHC RBC AUTO-ENTMCNC: 29.4 G/DL (ref 31.5–35.7)
MCV RBC AUTO: 75.6 FL (ref 79–97)
MONOCYTES # BLD AUTO: 0.6 10*3/MM3 (ref 0.1–0.9)
MONOCYTES NFR BLD AUTO: 8.6 % (ref 5–12)
NEUTROPHILS NFR BLD AUTO: 3.95 10*3/MM3 (ref 1.7–7)
NEUTROPHILS NFR BLD AUTO: 56.7 % (ref 42.7–76)
NRBC BLD AUTO-RTO: 0 /100 WBC (ref 0–0.2)
PLATELET # BLD AUTO: 343 10*3/MM3 (ref 140–450)
PMV BLD AUTO: 10.6 FL (ref 6–12)
RBC # BLD AUTO: 4.01 10*6/MM3 (ref 3.77–5.28)
WBC NRBC COR # BLD: 6.96 10*3/MM3 (ref 3.4–10.8)

## 2023-01-12 PROCEDURE — 80050 GENERAL HEALTH PANEL: CPT | Performed by: NURSE PRACTITIONER

## 2023-01-12 PROCEDURE — 80061 LIPID PANEL: CPT | Performed by: NURSE PRACTITIONER

## 2023-01-12 PROCEDURE — 90715 TDAP VACCINE 7 YRS/> IM: CPT | Performed by: NURSE PRACTITIONER

## 2023-01-12 PROCEDURE — 84466 ASSAY OF TRANSFERRIN: CPT | Performed by: NURSE PRACTITIONER

## 2023-01-12 PROCEDURE — 83540 ASSAY OF IRON: CPT | Performed by: NURSE PRACTITIONER

## 2023-01-12 PROCEDURE — 86803 HEPATITIS C AB TEST: CPT | Performed by: NURSE PRACTITIONER

## 2023-01-12 PROCEDURE — 99396 PREV VISIT EST AGE 40-64: CPT | Performed by: NURSE PRACTITIONER

## 2023-01-12 PROCEDURE — 83036 HEMOGLOBIN GLYCOSYLATED A1C: CPT | Performed by: NURSE PRACTITIONER

## 2023-01-12 PROCEDURE — 90471 IMMUNIZATION ADMIN: CPT | Performed by: NURSE PRACTITIONER

## 2023-01-13 LAB
ALBUMIN SERPL-MCNC: 4.6 G/DL (ref 3.5–5.2)
ALBUMIN/GLOB SERPL: 1.7 G/DL
ALP SERPL-CCNC: 62 U/L (ref 39–117)
ALT SERPL W P-5'-P-CCNC: 13 U/L (ref 1–33)
ANION GAP SERPL CALCULATED.3IONS-SCNC: 7.1 MMOL/L (ref 5–15)
AST SERPL-CCNC: 21 U/L (ref 1–32)
BILIRUB SERPL-MCNC: <0.2 MG/DL (ref 0–1.2)
BUN SERPL-MCNC: 8 MG/DL (ref 6–20)
BUN/CREAT SERPL: 14.3 (ref 7–25)
CALCIUM SPEC-SCNC: 9 MG/DL (ref 8.6–10.5)
CHLORIDE SERPL-SCNC: 102 MMOL/L (ref 98–107)
CHOLEST SERPL-MCNC: 150 MG/DL (ref 0–200)
CO2 SERPL-SCNC: 28.9 MMOL/L (ref 22–29)
CREAT SERPL-MCNC: 0.56 MG/DL (ref 0.57–1)
EGFRCR SERPLBLD CKD-EPI 2021: 115.6 ML/MIN/1.73
GLOBULIN UR ELPH-MCNC: 2.7 GM/DL
GLUCOSE SERPL-MCNC: 126 MG/DL (ref 65–99)
HBA1C MFR BLD: 5.2 % (ref 4.8–5.6)
HDLC SERPL-MCNC: 61 MG/DL (ref 40–60)
IRON 24H UR-MRATE: 18 MCG/DL (ref 37–145)
IRON SATN MFR SERPL: 4 % (ref 20–50)
LDLC SERPL CALC-MCNC: 73 MG/DL (ref 0–100)
LDLC/HDLC SERPL: 1.18 {RATIO}
POTASSIUM SERPL-SCNC: 3.9 MMOL/L (ref 3.5–5.2)
PROT SERPL-MCNC: 7.3 G/DL (ref 6–8.5)
SODIUM SERPL-SCNC: 138 MMOL/L (ref 136–145)
TIBC SERPL-MCNC: 441 MCG/DL (ref 298–536)
TRANSFERRIN SERPL-MCNC: 296 MG/DL (ref 200–360)
TRIGL SERPL-MCNC: 85 MG/DL (ref 0–150)
TSH SERPL DL<=0.05 MIU/L-ACNC: 1.01 UIU/ML (ref 0.27–4.2)
VLDLC SERPL-MCNC: 16 MG/DL (ref 5–40)

## 2023-01-14 LAB
HCV AB S/CO SERPL IA: <0.1 S/CO RATIO (ref 0–0.9)
HCV AB SERPL QL IA: NORMAL

## 2023-01-17 PROBLEM — D64.9 ANEMIA: Status: ACTIVE | Noted: 2023-01-17

## 2023-01-17 PROBLEM — F33.0 MAJOR DEPRESSIVE DISORDER, RECURRENT EPISODE, MILD DEGREE: Status: ACTIVE | Noted: 2023-01-17

## 2023-01-17 PROBLEM — R73.01 IFG (IMPAIRED FASTING GLUCOSE): Status: ACTIVE | Noted: 2023-01-17

## 2023-01-18 ENCOUNTER — TELEPHONE (OUTPATIENT)
Dept: INTERNAL MEDICINE | Facility: CLINIC | Age: 43
End: 2023-01-18
Payer: COMMERCIAL

## 2023-01-18 DIAGNOSIS — D50.9 IRON DEFICIENCY ANEMIA, UNSPECIFIED IRON DEFICIENCY ANEMIA TYPE: Primary | ICD-10-CM

## 2023-01-18 NOTE — TELEPHONE ENCOUNTER
ATTEMPTED TO CONTACT PATIENT. VERIFIED .     HUB OK TO READ/ADVISE:    Iron low. I am sending in iron supplement to take daily.   I also recommend rechecking in 3 months. Please schedule appt.       PLEASE SCHEDULE PATIENT FOR 3 MONTH FOLLOW-UP FOR IRON DEFICIENCY

## 2023-01-18 NOTE — TELEPHONE ENCOUNTER
----- Message from BERNARD Douglas sent at 1/18/2023  7:51 AM EST -----  Iron low. I am sending in iron supplement to take daily.   I also recommend rechecking in 3 months. Please schedule appt.

## 2023-02-18 ENCOUNTER — APPOINTMENT (OUTPATIENT)
Dept: ULTRASOUND IMAGING | Facility: HOSPITAL | Age: 43
End: 2023-02-18
Payer: COMMERCIAL

## 2023-02-18 ENCOUNTER — HOSPITAL ENCOUNTER (EMERGENCY)
Facility: HOSPITAL | Age: 43
Discharge: HOME | End: 2023-02-18
Attending: STUDENT IN AN ORGANIZED HEALTH CARE EDUCATION/TRAINING PROGRAM | Admitting: STUDENT IN AN ORGANIZED HEALTH CARE EDUCATION/TRAINING PROGRAM
Payer: COMMERCIAL

## 2023-02-18 VITALS
SYSTOLIC BLOOD PRESSURE: 135 MMHG | OXYGEN SATURATION: 100 % | WEIGHT: 149.69 LBS | TEMPERATURE: 98.2 F | DIASTOLIC BLOOD PRESSURE: 80 MMHG | BODY MASS INDEX: 22.69 KG/M2 | HEIGHT: 68 IN | HEART RATE: 61 BPM | RESPIRATION RATE: 16 BRPM

## 2023-02-18 DIAGNOSIS — R93.89 THICKENED ENDOMETRIUM: ICD-10-CM

## 2023-02-18 DIAGNOSIS — N83.201 RIGHT OVARIAN CYST: Primary | ICD-10-CM

## 2023-02-18 LAB
ALBUMIN SERPL-MCNC: 4.2 G/DL (ref 3.5–5.2)
ALBUMIN/GLOB SERPL: 1.6 G/DL
ALP SERPL-CCNC: 54 U/L (ref 39–117)
ALT SERPL W P-5'-P-CCNC: 35 U/L (ref 1–33)
ANION GAP SERPL CALCULATED.3IONS-SCNC: 8.3 MMOL/L (ref 5–15)
ANISOCYTOSIS BLD QL: NORMAL
AST SERPL-CCNC: 37 U/L (ref 1–32)
BASOPHILS # BLD AUTO: 0.06 10*3/MM3 (ref 0–0.2)
BASOPHILS NFR BLD AUTO: 1 % (ref 0–1.5)
BILIRUB SERPL-MCNC: 0.3 MG/DL (ref 0–1.2)
BILIRUB UR QL STRIP: NEGATIVE
BUN SERPL-MCNC: 8 MG/DL (ref 6–20)
BUN/CREAT SERPL: 17 (ref 7–25)
CALCIUM SPEC-SCNC: 9.1 MG/DL (ref 8.6–10.5)
CHLORIDE SERPL-SCNC: 103 MMOL/L (ref 98–107)
CLARITY UR: CLEAR
CO2 SERPL-SCNC: 25.7 MMOL/L (ref 22–29)
COLOR UR: YELLOW
CREAT SERPL-MCNC: 0.47 MG/DL (ref 0.57–1)
DEPRECATED RDW RBC AUTO: 62.8 FL (ref 37–54)
EGFRCR SERPLBLD CKD-EPI 2021: 120.6 ML/MIN/1.73
EOSINOPHIL # BLD AUTO: 0.53 10*3/MM3 (ref 0–0.4)
EOSINOPHIL NFR BLD AUTO: 8.4 % (ref 0.3–6.2)
ERYTHROCYTE [DISTWIDTH] IN BLOOD BY AUTOMATED COUNT: 23.1 % (ref 12.3–15.4)
GLOBULIN UR ELPH-MCNC: 2.7 GM/DL
GLUCOSE SERPL-MCNC: 95 MG/DL (ref 65–99)
GLUCOSE UR STRIP-MCNC: NEGATIVE MG/DL
HCG INTACT+B SERPL-ACNC: <0.5 MIU/ML
HCT VFR BLD AUTO: 33.4 % (ref 34–46.6)
HGB BLD-MCNC: 10.5 G/DL (ref 12–15.9)
HGB UR QL STRIP.AUTO: NEGATIVE
HOLD SPECIMEN: NORMAL
HOLD SPECIMEN: NORMAL
HYPOCHROMIA BLD QL: NORMAL
IMM GRANULOCYTES # BLD AUTO: 0.02 10*3/MM3 (ref 0–0.05)
IMM GRANULOCYTES NFR BLD AUTO: 0.3 % (ref 0–0.5)
KETONES UR QL STRIP: NEGATIVE
LEUKOCYTE ESTERASE UR QL STRIP.AUTO: NEGATIVE
LIPASE SERPL-CCNC: 32 U/L (ref 13–60)
LYMPHOCYTES # BLD AUTO: 1.57 10*3/MM3 (ref 0.7–3.1)
LYMPHOCYTES NFR BLD AUTO: 25 % (ref 19.6–45.3)
MCH RBC QN AUTO: 24.4 PG (ref 26.6–33)
MCHC RBC AUTO-ENTMCNC: 31.4 G/DL (ref 31.5–35.7)
MCV RBC AUTO: 77.5 FL (ref 79–97)
MICROCYTES BLD QL: NORMAL
MONOCYTES # BLD AUTO: 0.51 10*3/MM3 (ref 0.1–0.9)
MONOCYTES NFR BLD AUTO: 8.1 % (ref 5–12)
NEUTROPHILS NFR BLD AUTO: 3.6 10*3/MM3 (ref 1.7–7)
NEUTROPHILS NFR BLD AUTO: 57.2 % (ref 42.7–76)
NITRITE UR QL STRIP: NEGATIVE
NRBC BLD AUTO-RTO: 0 /100 WBC (ref 0–0.2)
PH UR STRIP.AUTO: 5.5 [PH] (ref 5–8)
PLATELET # BLD AUTO: 332 10*3/MM3 (ref 140–450)
PMV BLD AUTO: 9 FL (ref 6–12)
POTASSIUM SERPL-SCNC: 3.7 MMOL/L (ref 3.5–5.2)
PROT SERPL-MCNC: 6.9 G/DL (ref 6–8.5)
PROT UR QL STRIP: NEGATIVE
RBC # BLD AUTO: 4.31 10*6/MM3 (ref 3.77–5.28)
SMALL PLATELETS BLD QL SMEAR: ADEQUATE
SODIUM SERPL-SCNC: 137 MMOL/L (ref 136–145)
SP GR UR STRIP: <=1.005 (ref 1–1.03)
UROBILINOGEN UR QL STRIP: NORMAL
WBC MORPH BLD: NORMAL
WBC NRBC COR # BLD: 6.29 10*3/MM3 (ref 3.4–10.8)
WHOLE BLOOD HOLD COAG: NORMAL
WHOLE BLOOD HOLD SPECIMEN: NORMAL

## 2023-02-18 PROCEDURE — 85007 BL SMEAR W/DIFF WBC COUNT: CPT | Performed by: STUDENT IN AN ORGANIZED HEALTH CARE EDUCATION/TRAINING PROGRAM

## 2023-02-18 PROCEDURE — 81003 URINALYSIS AUTO W/O SCOPE: CPT

## 2023-02-18 PROCEDURE — 76830 TRANSVAGINAL US NON-OB: CPT

## 2023-02-18 PROCEDURE — 83690 ASSAY OF LIPASE: CPT | Performed by: STUDENT IN AN ORGANIZED HEALTH CARE EDUCATION/TRAINING PROGRAM

## 2023-02-18 PROCEDURE — 80053 COMPREHEN METABOLIC PANEL: CPT | Performed by: STUDENT IN AN ORGANIZED HEALTH CARE EDUCATION/TRAINING PROGRAM

## 2023-02-18 PROCEDURE — 36415 COLL VENOUS BLD VENIPUNCTURE: CPT

## 2023-02-18 PROCEDURE — 99283 EMERGENCY DEPT VISIT LOW MDM: CPT

## 2023-02-18 PROCEDURE — 85025 COMPLETE CBC W/AUTO DIFF WBC: CPT | Performed by: STUDENT IN AN ORGANIZED HEALTH CARE EDUCATION/TRAINING PROGRAM

## 2023-02-18 PROCEDURE — 84702 CHORIONIC GONADOTROPIN TEST: CPT | Performed by: STUDENT IN AN ORGANIZED HEALTH CARE EDUCATION/TRAINING PROGRAM

## 2023-02-18 RX ORDER — SODIUM CHLORIDE 0.9 % (FLUSH) 0.9 %
10 SYRINGE (ML) INJECTION AS NEEDED
Status: DISCONTINUED | OUTPATIENT
Start: 2023-02-18 | End: 2023-02-18 | Stop reason: HOSPADM

## 2023-02-18 NOTE — ED PROVIDER NOTES
Time: 12:43 PM EST  Date of encounter:  2/18/2023  Independent Historian/Clinical History and Information was obtained by: Patient and Chart  Chief Complaint: abdominal pain  History is limited by: N/A  Room number: 58/58     History of Present Illness:    History provided by:  Patient   used: No      Patient is a 42 y.o. year old female who presents to the Emergency Department via private car for evaluation of abdominal pain. Patient has no one at bedside on initial evaluation. Patient has a medical history of back pain. Patient has a surgical history of thyroid surgery and cholecystectomy. Patient no significant social histories.    Patient was evaluated and treated by Urgent Care (02/17/2023) for similar symptoms. Patient was discharged with over-the-counter pain medications.    Patient started experiencing symptoms of abdominal pain to the right lower quadrant with dysuria that started approximately 3-4 months ago and after each menstrual period. Patient states they are in moderate pain and rates their pain level 7 out of 10 at rest and with movement or activity. Patient states symptoms worsen after menstrual period. Patient denies all other symptoms. All other systems reviews are negative.    Patient tried prescription medication(s) with no relief to symptoms. Patient denies chance of pregnancy. Patient states she was diagnosed with UTI but not prescribed any antibiotics to treat it by her PCP.    Patient Care Team  Primary Care Provider: Rosaline Robertson APRN    Past Medical History:    No Known Allergies  Past Medical History:   Diagnosis Date   • Injury of back    • Sciatic nerve pain      Past Surgical History:   Procedure Laterality Date   • BREAST SURGERY      implants   • CHOLECYSTECTOMY     • THYROID SURGERY       Family History   Problem Relation Age of Onset   • Hypertension Father    • Diabetes Maternal Aunt    • Diabetes Maternal Uncle    • Cancer Paternal Uncle    •  "Hypertension Paternal Grandfather    • Diabetes Paternal Grandfather        Home Medications:  Prior to Admission medications    Medication Sig Start Date End Date Taking? Authorizing Provider   acetaminophen (TYLENOL) 500 MG tablet Take 1 tablet by mouth Every 6 (Six) Hours As Needed for Mild Pain. 2/17/23   Gerardo Mota MD   diclofenac (VOLTAREN) 75 MG EC tablet Take 1 tablet by mouth 2 (Two) Times a Day. 2/17/23   Gerardo Mota MD   Iron, Ferrous Sulfate, 325 (65 Fe) MG tablet Take 325 mg by mouth Daily. 1/18/23   Rosaline Robertson APRN   triamcinolone (KENALOG) 0.1 % ointment Apply 1 application topically to the appropriate area as directed 2 (Two) Times a Day. 1/12/23   Rosaline Robertson APRN        Social History:  Social History     Tobacco Use   • Smoking status: Never   • Smokeless tobacco: Never   Vaping Use   • Vaping Use: Never used   Substance Use Topics   • Alcohol use: Not Currently     Comment: occasion   • Drug use: Never         Review of Systems:   Review of Systems   Constitutional: Negative.    HENT: Negative.    Eyes: Negative.    Respiratory: Negative.    Cardiovascular: Negative.    Gastrointestinal: Positive for abdominal pain (right lower quadrant). Negative for diarrhea, nausea and vomiting.   Endocrine: Negative.    Genitourinary: Positive for dysuria.   Musculoskeletal: Negative.    Skin: Negative.    Allergic/Immunologic: Negative.    Neurological: Negative.    Hematological: Negative.    Psychiatric/Behavioral: Negative.         Physical Exam:   /80 (BP Location: Left arm, Patient Position: Sitting)   Pulse 61   Temp 98.2 °F (36.8 °C) (Oral)   Resp 16   Ht 172.7 cm (68\")   Wt 67.9 kg (149 lb 11.1 oz)   LMP 02/02/2023   SpO2 100%   Breastfeeding No   BMI 22.76 kg/m²     Physical Exam  Vitals and nursing note reviewed.   Constitutional:       Appearance: Normal appearance.   HENT:      Head: Normocephalic and atraumatic.      Nose: Nose normal.   Eyes:      " Extraocular Movements: Extraocular movements intact.      Conjunctiva/sclera: Conjunctivae normal.      Pupils: Pupils are equal, round, and reactive to light.   Cardiovascular:      Rate and Rhythm: Normal rate and regular rhythm.      Heart sounds: Normal heart sounds.   Pulmonary:      Effort: Pulmonary effort is normal.      Breath sounds: Normal breath sounds.   Abdominal:      General: Abdomen is flat.      Palpations: Abdomen is soft.      Tenderness: There is abdominal tenderness (mild) in the right lower quadrant. There is no guarding or rebound. Negative signs include Rovsing's sign.   Musculoskeletal:         General: Normal range of motion.      Cervical back: Normal range of motion and neck supple.   Skin:     General: Skin is warm and dry.   Neurological:      General: No focal deficit present.      Mental Status: She is alert and oriented to person, place, and time.   Psychiatric:         Mood and Affect: Mood normal.         Behavior: Behavior normal.         Thought Content: Thought content normal.         Judgment: Judgment normal.                  Procedures:  Procedures      Medical Decision Making:      Comorbidities that affect care:    None    External Notes reviewed:    Previous Clinic Note: Urgent care note from 2-      The following orders were placed and all results were independently analyzed by me:  Orders Placed This Encounter   Procedures   • US Non-ob Transvaginal   • [employer] Draw   • Comprehensive Metabolic Panel   • Lipase   • Urinalysis With Microscopic If Indicated (No Culture) - Urine, Clean Catch   • hCG, Quantitative, Pregnancy   • CBC Auto Differential   • Scan Slide   • Undress & Gown   • CBC & Differential   • Green Top (Gel)   • Lavender Top   • Gold Top - SST   • Light Blue Top       Medications Given in the Emergency Department:  Medications - No data to display     ED Course:    ED Course as of 02/18/23 1855   Sat Feb 18, 2023   0008 Spoke with Dr. Bronson.  He  states there is no need for an emergent evaluation at this time but does recommend the patient have 1 week follow-up with OB/GYN with possible oncology for surgical evaluation and endometrial biopsy [MD]      ED Course User Index  [MD] Max Stokes PA-C       Labs:    Lab Results (last 24 hours)     Procedure Component Value Units Date/Time    Urinalysis With Microscopic If Indicated (No Culture) - Urine, Clean Catch [[id number]]  (Normal) Collected: 02/18/23 1207    Specimen: Urine, Clean Catch Updated: 02/18/23 1215     Color, UA Yellow     Appearance, UA Clear     pH, UA 5.5     Specific Gravity, UA <=1.005     Glucose, UA Negative     Ketones, UA Negative     Bilirubin, UA Negative     Blood, UA Negative     Protein, UA Negative     Leuk Esterase, UA Negative     Nitrite, UA Negative     Urobilinogen, UA 0.2 E.U./dL    Narrative:      Urine microscopic not indicated.    CBC & Differential [[id number]]  (Abnormal) Collected: 02/18/23 1257    Specimen: Blood from Arm, Right Updated: 02/18/23 1406    Narrative:      The following orders were created for panel order CBC & Differential.  Procedure                               Abnormality         Status                     ---------                               -----------         ------                     CBC Auto Differential[[id number]]        Abnormal            Final result               Scan Slide[[id number]]                                       Final result                 Please view results for these tests on the individual orders.    Comprehensive Metabolic Panel [[id number]]  (Abnormal) Collected: 02/18/23 1257    Specimen: Blood from Arm, Right Updated: 02/18/23 1329     Glucose 95 mg/dL      BUN 8 mg/dL      Creatinine 0.47 mg/dL      Sodium 137 mmol/L      Potassium 3.7 mmol/L      Chloride 103 mmol/L      CO2 25.7 mmol/L      Calcium 9.1 mg/dL      Total Protein 6.9 g/dL      Albumin 4.2 g/dL      ALT (SGPT) 35 U/L      AST (SGOT) 37 U/L       Alkaline Phosphatase 54 U/L      Total Bilirubin 0.3 mg/dL      Globulin 2.7 gm/dL      A/G Ratio 1.6 g/dL      BUN/Creatinine Ratio 17.0     Anion Gap 8.3 mmol/L      eGFR 120.6 mL/min/1.73     Narrative:      GFR Normal >60  Chronic Kidney Disease <60  Kidney Failure <15      Lipase [[id number]]  (Normal) Collected: 02/18/23 1257    Specimen: Blood from Arm, Right Updated: 02/18/23 1329     Lipase 32 U/L     hCG, Quantitative, Pregnancy [[id number]] Collected: 02/18/23 1257    Specimen: Blood from Arm, Right Updated: 02/18/23 1328     HCG Quantitative <0.50 mIU/mL     Narrative:      HCG Ranges by Gestational Age    Females - non-pregnant premenopausal   </= 1mIU/mL HCG  Females - postmenopausal               </= 7mIU/mL HCG    3 Weeks       5.4   -      72 mIU/mL  4 Weeks      10.2   -     708 mIU/mL  5 Weeks       217   -   8,245 mIU/mL  6 Weeks       152   -  32,177 mIU/mL  7 Weeks     4,059   - 153,767 mIU/mL  8 Weeks    31,366   - 149,094 mIU/mL  9 Weeks    59,109   - 135,901 mIU/mL  10 Weeks   44,186   - 170,409 mIU/mL  12 Weeks   27,107   - 201,615 mIU/mL  14 Weeks   24,302   -  93,646 mIU/mL  15 Weeks   12,540   -  69,747 mIU/mL  16 Weeks    8,904   -  55,332 mIU/mL  17 Weeks    8,240   -  51,793 mIU/mL  18 Weeks    9,649   -  55,271 mIU/mL    Results may be falsely decreased if patient taking Biotin.      CBC Auto Differential [[id number]]  (Abnormal) Collected: 02/18/23 1257    Specimen: Blood from Arm, Right Updated: 02/18/23 1406     WBC 6.29 10*3/mm3      RBC 4.31 10*6/mm3      Hemoglobin 10.5 g/dL      Hematocrit 33.4 %      MCV 77.5 fL      MCH 24.4 pg      MCHC 31.4 g/dL      RDW 23.1 %      RDW-SD 62.8 fl      MPV 9.0 fL      Platelets 332 10*3/mm3      Neutrophil % 57.2 %      Lymphocyte % 25.0 %      Monocyte % 8.1 %      Eosinophil % 8.4 %      Basophil % 1.0 %      Immature Grans % 0.3 %      Neutrophils, Absolute 3.60 10*3/mm3      Lymphocytes, Absolute 1.57 10*3/mm3      Monocytes, Absolute  0.51 10*3/mm3      Eosinophils, Absolute 0.53 10*3/mm3      Basophils, Absolute 0.06 10*3/mm3      Immature Grans, Absolute 0.02 10*3/mm3      nRBC 0.0 /100 WBC     Scan Slide [[id number]] Collected: 02/18/23 1257    Specimen: Blood from Arm, Right Updated: 02/18/23 1406     Anisocytosis Mod/2+     Hypochromia Slight/1+     Microcytes Slight/1+     WBC Morphology Normal     Platelet Estimate Adequate           Imaging:    US Non-ob Transvaginal    Result Date: 2/18/2023  PROCEDURE: US NON-OB TRANSVAGINAL  COMPARISON: [facility], CT, ABDOMEN/PELVIS WITH CONTRAST, 3/14/2019, 17:21.  INDICATIONS: R pelvic pain  TECHNIQUE: Ultrasound examination of the pelvis was performed, using endovaginal technique.   FINDINGS:  The uterus appears diffusely heterogeneous.  There are more well-defined masslike lesions within the myometrium.  The largest measures 1.9 cm x 1.7 cm, suspected to represent leiomyomas.  The endometrium measures 2.8 cm in thickness.  Arising from the right ovary is a 4.8 cm x 4.6 cm x 5.4 cm hypoechoic lesion.  It demonstrates no internal blood flow.  There are apparent polypoid soft tissue extrusions along the wall of the lesion measuring up to 1.3 cm.  Adjacent to this is largely anechoic mass measuring 2.0 cm x 3.5 cm x 4.2 cm.  There appears to be wall thickening of this focus superiorly.  Arising from the left ovary is a simple appearing cystic focus measuring 2.0 cm, favored to represent a dominant follicle or functional cyst.  Normal blood flow is noted in both ovaries.  There are prominent parametrial vessels bilaterally measuring up to 9 mm in diameter.  No free pelvic fluid is seen.        1. Multiple suspected uterine leiomyomas. 2. Markedly thickened endometrium.  This may be secondary to endometrial hyperplasia.  A submucosal fibroid and a polyp would also be in the differential.  Neoplasm is not definitively excluded.  Gynecologic consultation is recommended. 3. Complex cystic  focus arising from the right ovary measuring 4.8 cm x 4.6 cm x 5.4 cm.  Differential would include neoplasm, hemorrhagic cyst and endometrioma.  Consider pelvic MRI to further evaluate.  4. Prominent parametrial vessels bilaterally.  Correlate clinically for pelvic congestion syndrome. 5. Suspected dominant follicle or functional cyst elsewhere on the right ovary measuring 4.2 cm x 3.5 cm x 2.0 cm.     Yaw Rapp M.D.       Electronically Signed and Approved By: Yaw Rapp M.D. on 2/18/2023 at 15:24                 Differential Diagnosis and Discussion:    Abdominal Pain: Based on the patient's signs and symptoms, I considered abdominal aortic aneurysm, small bowel obstruction, pancreatitis, acute cholecystitis, acute appendecitis, peptic ulcer disease, gastritis, colitis, endocrine disorders, irritable bowel syndrome and other differential diagnosis an etiology of the patient's abdominal pain.    All labs were reviewed and interpreted by me.  Ultrasound interpretation was reviewed by me.     MDM  Number of Diagnoses or Management Options  Diagnosis management comments: Patient presented to the emergency department for right lower quadrant/pelvic pain.  CBC is unremarkable white blood cell count.  CMP is noted to have mildly elevated ALT and AST.  hCG is negative.  Lipase unremarkable.  Urinalysis is unremarkable.  Transvaginal ultrasound shows a markedly thickened endometrium and a complex cystic focus on the right ovary that measures 4.8 x 4.6 x 5.4 cm.  I did discuss this patient with OB/GYN hospitalist Dr. Bronson who stated the patient needs quick follow-up with OB/GYN within approximately 1 week for possible surgical consultation and biopsy.  Patient does have an appointment with her OB/GYN on 3-2-2023.  I educated the patient on all of her findings and informed her that she should call her OB/GYN and have her appointment moved up due to concerning ultrasound findings.       Amount and/or Complexity of  Data Reviewed  Clinical lab tests: reviewed and ordered  Tests in the radiology section of CPT®: reviewed and ordered  Discuss the patient with other providers: yes (Dr. Bronson)    Risk of Complications, Morbidity, and/or Mortality  Presenting problems: moderate  Diagnostic procedures: low  Management options: low    Patient Progress  Patient progress: stable       Patient Care Considerations:    CT ABDOMEN AND PELVIS: I considered ordering a CT scan of the abdomen and pelvis however Pelvic ultrasound not a cause of the patient's abdominal pain and the tenderness was mild.      Consultants/Shared Management Plan:    I have discussed the case with Dr. Bronson who states that the patient can be safely discharged with close follow up.    Social Determinants of Health:    Patient is independent, reliable, and has access to care.       Disposition and Care Coordination:    Discharged: The patient is suitable and stable for discharge with no need for consideration of observation or admission.    I have explained the patient´s condition, diagnoses and treatment plan based on the information available to me at this time. I have answered questions and addressed any concerns. The patient has a good  understanding of the patient´s diagnosis, condition, and treatment plan as can be expected at this point. The vital signs have been stable. The patient´s condition is stable and appropriate for discharge from the emergency department.      The patient will pursue further outpatient evaluation with the primary care physician or other designated or consulting physician as outlined in the discharge instructions. They are agreeable to this plan of care and follow-up instructions have been explained in detail. The patient has received these instructions in written format and have expressed an understanding of the discharge instructions. The patient is aware that any significant change in condition or worsening of symptoms should prompt an  immediate return to this or the closest emergency department or call to 911.    Final diagnoses:   Right ovarian cyst   Thickened endometrium        ED Disposition     ED Disposition   Discharge    Condition   Stable    Comment   --             This medical record created using voice recognition software.    Documentation assistance provided by Adriana Casiano acting as scribe for Max Stokes PA-C. Information recorded by the scribe was done at my direction and has been verified and validated by me.      Adriana Casiano  02/18/23 1301       Max Stokes PA-C  02/18/23 2382

## 2023-02-18 NOTE — DISCHARGE INSTRUCTIONS
Is important that you call your OB/GYN to move up your follow-up appointment.  Please inform them that you had an abnormal ultrasound and it would be important that you follow-up within the next week for possible surgical consultation.

## 2023-02-28 ENCOUNTER — DOCUMENTATION (OUTPATIENT)
Dept: PHYSICAL THERAPY | Facility: CLINIC | Age: 43
End: 2023-02-28
Payer: COMMERCIAL

## 2023-02-28 NOTE — PROGRESS NOTES
Pt no showed last visit.  Unable to re-assess objective measurements/progress towards goals.  Pt discharged per no show/cancellation policy.

## 2023-03-02 ENCOUNTER — OFFICE VISIT (OUTPATIENT)
Dept: OBSTETRICS AND GYNECOLOGY | Facility: CLINIC | Age: 43
End: 2023-03-02
Payer: COMMERCIAL

## 2023-03-02 VITALS
SYSTOLIC BLOOD PRESSURE: 118 MMHG | BODY MASS INDEX: 22.94 KG/M2 | DIASTOLIC BLOOD PRESSURE: 73 MMHG | HEART RATE: 73 BPM | WEIGHT: 151.4 LBS | HEIGHT: 68 IN

## 2023-03-02 DIAGNOSIS — R10.2 PELVIC PAIN: ICD-10-CM

## 2023-03-02 DIAGNOSIS — D21.9 FIBROIDS: ICD-10-CM

## 2023-03-02 DIAGNOSIS — Z12.31 ENCOUNTER FOR SCREENING MAMMOGRAM FOR MALIGNANT NEOPLASM OF BREAST: ICD-10-CM

## 2023-03-02 DIAGNOSIS — Z01.419 WELL WOMAN EXAM: Primary | ICD-10-CM

## 2023-03-02 DIAGNOSIS — N83.201 CYST OF RIGHT OVARY: ICD-10-CM

## 2023-03-02 PROCEDURE — G0123 SCREEN CERV/VAG THIN LAYER: HCPCS | Performed by: NURSE PRACTITIONER

## 2023-03-02 PROCEDURE — 87624 HPV HI-RISK TYP POOLED RSLT: CPT | Performed by: NURSE PRACTITIONER

## 2023-03-02 PROCEDURE — 99213 OFFICE O/P EST LOW 20 MIN: CPT | Performed by: NURSE PRACTITIONER

## 2023-03-02 PROCEDURE — 99396 PREV VISIT EST AGE 40-64: CPT | Performed by: NURSE PRACTITIONER

## 2023-03-02 NOTE — PROGRESS NOTES
"  HPI:   42 y.o. . Presents for well woman exam. Contraception or HRT: Contraception:  None, Contraception:  Abstinence  Menses:   Irregular for past 4 months, occurs every 21-30 days, lasts up to 2 weeks, heavy with clots,  changes products q 1-2 hours, tampon and pad on heaviest days.   Pain:  moderate to severe, greater on right side radiates up toward right hip  Last pap: normal   Complaints: daily right sided pelvic pain for the past 4 months, radiates up toward right upper abdomen and hip through to back. Pain increased during menstruation.   Tylenol dulls  No pain with intercourse, last intercourse November.   Normal bowel and bladder function  Was seen in the ER for the pelvic pain  Abnormal Pelvic ultrasound US Non-ob Transvaginal (2023 14:08)   Slight elevation liver ALT/AST, Normal TSH, hgb 10.5, hx CHARISSA  Previous hx ovarian cyst and uterine fibroids noted per CT pelvis 3-      Past Medical History:   Diagnosis Date   • Depression    • Injury of back    • Ovarian cyst    • Sciatic nerve pain    • Thyroid disease       Past Surgical History:   Procedure Laterality Date   • BREAST SURGERY      implants   • CHOLECYSTECTOMY     • THYROID SURGERY        Family History   Problem Relation Age of Onset   • Hypertension Father    • Hypertension Paternal Grandfather    • Diabetes Paternal Grandfather    • Diabetes Maternal Aunt    • Diabetes Maternal Uncle    • Cancer Paternal Uncle         stomach   • Breast cancer Neg Hx    • Ovarian cancer Neg Hx    • Uterine cancer Neg Hx    • Prostate cancer Neg Hx    • Colon cancer Neg Hx      Allergies as of 2023   • (No Known Allergies)        PCP: does manage PMHx and preventative labs    /73   Pulse 73   Ht 172.7 cm (68\")   Wt 68.7 kg (151 lb 6.4 oz)   LMP 2023   BMI 23.02 kg/m²     PHYSICAL EXAM: Chaperone present   General- NAD, alert and oriented, appropriate  Psych- Normal mood, good memory  Neck- No masses, no thyroid " enlargement  Lymphatic- No palpable neck, axillary, or groin nodes  CV- Regular rhythm, no murmurs  Resp- CTA to bases, no wheezes  Abdomen- Soft, non distended, non tender, no masses  Breast left-  Implant, bilaterally symmetrical, no masses, non tender, no nipple discharge  Breast right- implant, bilaterally symmetrical, no masses, non tender, no nipple discharge  External genitalia- Normal female, no lesions  Urethra/meatus- Normal, no masses, non tender, no prolapse  Bladder- Normal, no masses, non tender, no prolapse  Vagina- Normal, no atrophy, no lesions, no discharge, no prolapse  Cvx- Normal, no lesions, no discharge, No cervical motion tenderness  Uterus- Normal size, shape & consistency.  Minimally tender, mobile, & no prolapse  Adnexa- No mass, non tender  Anus/Rectum/Perineum- Not performed  Ext- No edema, no cyanosis    Skin- No lesions, no rashes, no acanthosis nigricans    ASSESSMENT and PLAN:    Diagnoses and all orders for this visit:    1. Well woman exam (Primary)  -     IgP, Aptima HPV    2. Encounter for screening mammogram for malignant neoplasm of breast  -     Mammo Screening Digital Tomosynthesis Bilateral With CAD; Future    3. Pelvic pain  -     US Pelvis Transvaginal Non OB; Future    4. Fibroids    5. Cyst of right ovary  -     ; Future  -     CEA; Future  -     Cancer Antigen 19-9; Future  -     US Pelvis Transvaginal Non OB; Future      Preventative:   BREAST HEALTH- Monthly self breast exam importance and how to reviewed. MMG and/or MRI (prn) reviewed per society guidelines and her individual history. Screen: Updated today  CERVICAL CANCER Screening- Reviewed current ASCCP guidelines for screening w and wo cotest HPV, age specific.  Screen: Updated today  COLON CANCER Screening- Reviewed current medical society guidelines and options.  Screen:  Not medically needed  SEXUAL HEALTH: Declines STD screening  Importance of WEIGHT MANAGEMENT, nutrition, and exercise  reviewed  VACCINATIONS Recommended: Vaccination are up to date.  Importance discussed, risk being unvaccinated reviewed.  Questions answered  Smoking status- NON SMOKER  Follow up PCP/Specialist PMHx and Labs  Myriad: Does not qualify.  FIBROIDS- Dx, incidence, symptoms, treatment options wrt pt hx NLT: expectant, hormonal (BC, IUD, Lupron, others), myomectomy, UT aa embolization, hysterectomy.    She understands the importance of having any ordered tests to be performed in a timely fashion.  The risks of not performing them include, but are not limited to, advanced cancer stages, bone loss from osteoporosis and/or subsequent increase in morbidity and/or mortality.  She is encouraged to review her results online and/or contact or office if she has questions.     Follow Up:  Return for with MD after inhouse ultrasound, per Dr. Orozco.        Flower Hendrix, APRN  03/02/2023

## 2023-03-03 ENCOUNTER — LAB (OUTPATIENT)
Dept: LAB | Facility: HOSPITAL | Age: 43
End: 2023-03-03
Payer: COMMERCIAL

## 2023-03-03 ENCOUNTER — LAB (OUTPATIENT)
Dept: OBSTETRICS AND GYNECOLOGY | Facility: CLINIC | Age: 43
End: 2023-03-03
Payer: COMMERCIAL

## 2023-03-03 DIAGNOSIS — N83.201 CYST OF RIGHT OVARY: ICD-10-CM

## 2023-03-03 LAB
CANCER AG125 SERPL QL: 20.1 U/ML (ref 0–38.1)
CANCER AG19-9 SERPL-ACNC: 8.5 U/ML
CEA SERPL-MCNC: <0.6 NG/ML

## 2023-03-03 PROCEDURE — 36415 COLL VENOUS BLD VENIPUNCTURE: CPT

## 2023-03-03 PROCEDURE — 82378 CARCINOEMBRYONIC ANTIGEN: CPT | Performed by: NURSE PRACTITIONER

## 2023-03-03 PROCEDURE — 86301 IMMUNOASSAY TUMOR CA 19-9: CPT | Performed by: NURSE PRACTITIONER

## 2023-03-03 PROCEDURE — 86304 IMMUNOASSAY TUMOR CA 125: CPT | Performed by: NURSE PRACTITIONER

## 2023-03-07 NOTE — PROGRESS NOTES
GYN Visit    Chief Complaint   Patient presents with   • Procedure     EMBX       HPI:   42 y.o. LMP: Patient's last menstrual period was 2023 (exact date).     Social History     Substance and Sexual Activity   Sexual Activity Not Currently   • Partners: Male   • Birth control/protection: None    Comment:  overseas long term, [place] visits q 6mo     CBC & Differential (2023 12:57)    (2023 12:38)   IgP, Aptima HPV (2023 14:17)-in process  Progress Notes by Flower Hendrix APRN (2023 13:30)   US Pelvis Transvaginal Non OB (2023 17:31)       Patient seen for annual visit with BERNARD Hendrix .  Patient noted menometrorrhagia along with right sided pelvic pain for 4 months.  Worsening with menses.  US 2023 showed uterus enlarged heterogenous with suspected multiple uterine leiomyomas.  Largest measuring 2 cm.  Markedly thickened endometrium at 2.8 cm.  Radiology unsure regarding fibroid versus polyp versus malignancy.  Complex right ovarian cyst maximum dimension 5.5 cm with differential including endometrioma versus hemorrhagic cyst versus neoplasm. Prominent parametrial vessels clinically consistent with pelvic congestion syndrome.  Right ovary also with a dominant or functional cyst measuring 4.2 cm.   normal at 20.1.  Motrin helps the pain.  After motrin pain is 2/10.  Takes 200mg q 4-5hrs. Prior to 4 to 5 months ago she had no issues with her menses or pain.    ROS-no pain with intercourse, no bladder or bowel symptoms    Follow-up ultrasound yesterday with overall enlarged uterus and 2 fibroids intramural/subserosal/exophytic largest measuring 2.5 cm and is posterior fundal.  Endometrial lining still appears thick at 14 mm with anechoic areas within consistent with possible polyp, however patient was on menses.  Technician noted patient bleeding very heavily during exam.  Right ovary has 6.1 x 4.5 cm complex cyst with 2  hyperechoic areas within.    Patient's  is in [place] and returns every 6 months.  He desires another child, but she has no further childbearing desires.    History: PMHx, Meds, Allergies, PSHx, Social Hx, and POBHx all reviewed and updated.    PHYSICAL EXAM:  /83   Pulse 71   Wt 70.3 kg (155 lb)   LMP 03/04/2023 (Exact Date)   Breastfeeding No   BMI 23.57 kg/m²   General- NAD, alert and oriented, appropriate  Psych- Normal mood, good memory    Abdomen- Soft, non distended, mildly tender right lower quadrant, no masses    External genitalia- Normal female, no lesions  Urethra/meatus- Normal, no masses, non tender, no prolapse  Bladder- Normal, no masses, non tender, no prolapse  Vagina- Normal, no atrophy, no lesions, no discharge, no prolapse  Cervix- Normal, no lesions, no discharge, No cervical motion tenderness, small amount of blood consistent with end of menses  Uterus- Normal size, slightly irregular in shape & normal consistency.  Non tender, mobile, & no prolapse  Adnexa-tenderness on the right fullness of the entire right side into the cul-de-sac, mass appears fixed/nonmobile.    Lymphatic- No palpable groin nodes  Extremities- No edema, no cyanosis    Skin- No lesions, no rashes      ASSESSMENT AND PLAN:  Diagnoses and all orders for this visit:    1. Cyst of right ovary w pelvic pain/dysmenorrhea (Primary)  Comments:  Fixed, nonmobile, complex  [place]  [place] [facility]  [place] ovary with 6.1 x 4.5 cm complex cyst with 2 hyperechoic areas within.    Right ovary also contains 4.1 x 2.3 cm echo-free cyst with smooth borders.    Feb 2023CA 125 wnl 20.1         Orders:  -     Ambulatory Referral to Gynecologic Oncology    2. Menometrorrhagia w resultant anemia and thickened EMS  Comments:  Continue iron, recommend QOD  Overview:  January 2023-PCP hematocrit 30 normal TSH  [place] [facility]  Uterus: 10.1 x 5.7 x 5.4 cm  Endometrium: 14.06 mm, lining with anechoic areas consistent with possible  polyp  Ovaries: Left: 2.6 x 1.5 cm.  Right 6.8 x 5.7 cm. Both ovaries with good blood flow.  Right ovary with 6.1 x 4.5 cm complex cyst with 2 hyperechoic areas within.    Right ovary also contains 4.1 x 2.3 cm echo-free cyst with smooth borders.  Multiple small fibroids identified measuring 1.9 x 1.1 cm appears to be anterior and intramural/subserosal and 2.0 x 2.5 cm appears to be posterior fundal subserosal/exophytic.      Orders:  -     Prolactin  -     T4, Free  -     Tissue Pathology Exam      3. Uterine leiomyoma, unspecified location  Overview:  March 2023 Multiple small fibroids identified measuring 1.9 x 1.1 cm appears to be anterior and intramural/subserosal and 2.0 x 2.5 cm appears to be posterior fundal subserosal/exophytic.      4. Pelvic congestive syndrome      I discussed with patient today her ovarian cyst and pain and her bleeding.  I suspect her pain is secondary to her ovarian cyst.  The fact that she has pain mostly with her menses points more towards endometriosis, however she has never had issues with endometriosis prior to the last 4 to 5 months.  The ultrasound is concerning and her exam more so as this mass is fixed and filling her entire posterior cul-de-sac.  We discussed the possibility of malignancy.  Also other benign etiologies like hemorrhagic ovarian cyst or endometrioma or even LMP tumor.  Patient's  is in [facility] and has always wanted another child.  Patient does not desire another child but would like to discuss with her  definitive surgery with hysterectomy which also would manage her menorrhagia.  Her fibroids do not appear to be affecting the endometrial lining.  We also discussed endometrial biopsy is only 94 to 95% sensitive so a premalignant or malignant lesion potentially could be missed.  Lastly we discussed pelvic congestion syndrome may be a part of her pain or an incidental finding.  She plans to discuss with her  prior to meeting with Dr. Woodward  re their desires for future childbearing.  I have discussed with advanced maternal age risks associated with pregnancy in particular aneuploidy with respect to her age, and increased maternal and fetal morbidity and mortality.  All her questions were answered to her satisfaction.  Report provided.        Follow Up:  Return after eval w [facility] as needed.          Kimberly Rondon, DO  03/09/2023    [facility]  [address] [place] [address]  Dept: [phone]  Dept Fax: [phone]  Loc: [phone]  Loc Fax: [phone]     Endometrial Biopsy Procedure Note      CC:  Pt presents for Endometrial Bx  Chief Complaint   Patient presents with   • Procedure     EMBX       Social History     Substance and Sexual Activity   Sexual Activity Not Currently   • Partners: Male   • Birth control/protection: None    Comment:  overseas long term, [facility] visits q 6mo       LMP: Patient's last menstrual period was 03/04/2023 (exact date).     Procedure reviewed in detail.  She understands the potential risks include, but are not limited to, pain, bleeding, uterine perforation and infection.  Her questions have been answered.      Subjective/HPI:  See above    Objective:  /83   Pulse 71   Wt 70.3 kg (155 lb)   LMP 03/04/2023 (Exact Date)   Breastfeeding No   BMI 23.57 kg/m²   External genitalia- Without lesion   Vulva/Vagina/Perineum- Without lesion  Cervix- Without lesion  Uterus- Irregular contour  Betadine/Hibiclens x3.  Tenaculum placed.  Uterus sounded to 8cm.    EMBx performed without difficulty.    Tissue sent for pathology.    Patient tolerated the procedure well.    Assessment and Plan:  Diagnoses and all orders for this visit:    1. Menometrorrhagia w resultant anemia  Comments:  Continue iron, recommend QOD  Overview:  January 2023-PCP hematocrit 30 normal TSH  March 2023 [facility]  Uterus: 10.1 x 5.7 x 5.4 cm  Endometrium: 14.06 mm, lining with anechoic  areas consistent with possible polyp  Ovaries: Left: 2.6 x 1.5 cm.  Right 6.8 x 5.7 cm. Both ovaries with good blood flow.  Right ovary with 6.1 x 4.5 cm complex cyst with 2 hyperechoic areas within.    Right ovary also contains 4.1 x 2.3 cm echo-free cyst with smooth borders.  Multiple small fibroids identified measuring 1.9 x 1.1 cm appears to be anterior and intramural/subserosal and 2.0 x 2.5 cm appears to be posterior fundal subserosal/exophytic.      Orders:  -     Prolactin  -     T4, Free  -     Tissue Pathology Exam    2. Thickened endometrium  -     Tissue Pathology Exam    3. Encounter for preprocedural laboratory examination  -     POC Pregnancy, Urine        Counseling:  Biopsy is ~94% sensitive, a negative biopsy is not 100% certain of no malignancy.   PRECAUTIONS - It is common to have bright red spotting and/or bleeding.  She should not be bleeding heavily.  She can use OTC pain relievers prn.  She needs to return to office or ER (if after hours/weekends) if she has pelvic pain, bleeding > 1 pad/2 hours, discharge that has a bad vaginal odor or vaginal itching, fever > 101.5, or any other concerns.    Pt to call office if hasn't received results and recommended next steps in the next 7-10days.              Follow Up:  Return after eval w [facility] as needed        Kimberly Rondon DO  03/09/2023    [facility]  [facility] [place] [address]  Dept: [phone]  Dept Fax: [phone]  Loc: [phone]  Loc Fax: [phone]

## 2023-03-08 PROBLEM — R10.2 PELVIC PAIN: Status: ACTIVE | Noted: 2023-03-08

## 2023-03-08 PROBLEM — N83.201 CYST OF RIGHT OVARY: Status: ACTIVE | Noted: 2023-03-08

## 2023-03-08 PROBLEM — R93.89 THICKENED ENDOMETRIUM: Status: ACTIVE | Noted: 2023-03-08

## 2023-03-08 PROBLEM — N94.6 DYSMENORRHEA: Status: ACTIVE | Noted: 2023-03-08

## 2023-03-08 PROBLEM — N94.89 PELVIC CONGESTIVE SYNDROME: Status: ACTIVE | Noted: 2023-03-08

## 2023-03-08 PROBLEM — D25.9 LEIOMYOMA OF UTERUS, UNSPECIFIED: Status: ACTIVE | Noted: 2023-03-08

## 2023-03-08 PROBLEM — N92.1 MENOMETRORRHAGIA: Status: ACTIVE | Noted: 2023-03-08

## 2023-03-09 ENCOUNTER — OFFICE VISIT (OUTPATIENT)
Dept: OBSTETRICS AND GYNECOLOGY | Facility: CLINIC | Age: 43
End: 2023-03-09
Payer: COMMERCIAL

## 2023-03-09 VITALS
SYSTOLIC BLOOD PRESSURE: 126 MMHG | HEART RATE: 71 BPM | BODY MASS INDEX: 23.57 KG/M2 | WEIGHT: 155 LBS | DIASTOLIC BLOOD PRESSURE: 83 MMHG

## 2023-03-09 DIAGNOSIS — Z01.812 ENCOUNTER FOR PREPROCEDURAL LABORATORY EXAMINATION: ICD-10-CM

## 2023-03-09 DIAGNOSIS — R93.89 THICKENED ENDOMETRIUM: ICD-10-CM

## 2023-03-09 DIAGNOSIS — N83.201 CYST OF RIGHT OVARY: Primary | ICD-10-CM

## 2023-03-09 DIAGNOSIS — N94.89 PELVIC CONGESTIVE SYNDROME: ICD-10-CM

## 2023-03-09 DIAGNOSIS — D25.9 UTERINE LEIOMYOMA, UNSPECIFIED LOCATION: ICD-10-CM

## 2023-03-09 DIAGNOSIS — N92.1 MENOMETRORRHAGIA: ICD-10-CM

## 2023-03-09 LAB
CYTOLOGIST CVX/VAG CYTO: NORMAL
CYTOLOGY CVX/VAG DOC CYTO: NORMAL
CYTOLOGY CVX/VAG DOC THIN PREP: NORMAL
DX ICD CODE: NORMAL
HIV 1 & 2 AB SER-IMP: NORMAL
HPV I/H RISK 4 DNA CVX QL PROBE+SIG AMP: NEGATIVE
OTHER STN SPEC: NORMAL
STAT OF ADQ CVX/VAG CYTO-IMP: NORMAL

## 2023-03-09 PROCEDURE — 84146 ASSAY OF PROLACTIN: CPT | Performed by: OBSTETRICS & GYNECOLOGY

## 2023-03-09 PROCEDURE — 58100 BIOPSY OF UTERUS LINING: CPT | Performed by: OBSTETRICS & GYNECOLOGY

## 2023-03-09 PROCEDURE — 84439 ASSAY OF FREE THYROXINE: CPT | Performed by: OBSTETRICS & GYNECOLOGY

## 2023-03-09 PROCEDURE — 99214 OFFICE O/P EST MOD 30 MIN: CPT | Performed by: OBSTETRICS & GYNECOLOGY

## 2023-03-09 PROCEDURE — 88305 TISSUE EXAM BY PATHOLOGIST: CPT | Performed by: OBSTETRICS & GYNECOLOGY

## 2023-03-09 RX ORDER — IBUPROFEN 400 MG/1
200 TABLET ORAL EVERY 6 HOURS PRN
COMMUNITY
End: 2023-03-28

## 2023-03-10 DIAGNOSIS — E22.1 HYPERPROLACTINEMIA: Primary | ICD-10-CM

## 2023-03-10 LAB
PROLACTIN SERPL-MCNC: 25.6 NG/ML (ref 4.79–23.3)
T4 FREE SERPL-MCNC: 1.2 NG/DL (ref 0.93–1.7)

## 2023-03-13 ENCOUNTER — OFFICE VISIT (OUTPATIENT)
Dept: GYNECOLOGIC ONCOLOGY | Facility: CLINIC | Age: 43
End: 2023-03-13
Payer: COMMERCIAL

## 2023-03-13 ENCOUNTER — PREP FOR SURGERY (OUTPATIENT)
Dept: OTHER | Facility: HOSPITAL | Age: 43
End: 2023-03-13
Payer: COMMERCIAL

## 2023-03-13 VITALS
OXYGEN SATURATION: 100 % | BODY MASS INDEX: 22.69 KG/M2 | WEIGHT: 153.2 LBS | DIASTOLIC BLOOD PRESSURE: 75 MMHG | RESPIRATION RATE: 12 BRPM | HEIGHT: 69 IN | TEMPERATURE: 99.3 F | SYSTOLIC BLOOD PRESSURE: 129 MMHG | HEART RATE: 86 BPM

## 2023-03-13 DIAGNOSIS — N83.8 MASS OF RIGHT OVARY: Primary | ICD-10-CM

## 2023-03-13 DIAGNOSIS — R10.2 PELVIC PAIN: Primary | ICD-10-CM

## 2023-03-13 PROBLEM — N83.291 COMPLEX CYST OF RIGHT OVARY: Status: ACTIVE | Noted: 2023-03-13

## 2023-03-13 LAB
CYTO UR: NORMAL
LAB AP CASE REPORT: NORMAL
LAB AP CLINICAL INFORMATION: NORMAL
PATH REPORT.FINAL DX SPEC: NORMAL
PATH REPORT.GROSS SPEC: NORMAL

## 2023-03-13 PROCEDURE — 99204 OFFICE O/P NEW MOD 45 MIN: CPT | Performed by: OBSTETRICS & GYNECOLOGY

## 2023-03-13 RX ORDER — ONDANSETRON 2 MG/ML
4 INJECTION INTRAMUSCULAR; INTRAVENOUS EVERY 6 HOURS PRN
Status: CANCELLED | OUTPATIENT
Start: 2023-03-13

## 2023-03-13 RX ORDER — HYDROCODONE BITARTRATE AND ACETAMINOPHEN 5; 325 MG/1; MG/1
1 TABLET ORAL EVERY 6 HOURS PRN
Qty: 20 TABLET | Refills: 0 | Status: SHIPPED | OUTPATIENT
Start: 2023-03-13 | End: 2023-03-23 | Stop reason: SDUPTHER

## 2023-03-13 RX ORDER — SODIUM CHLORIDE 0.9 % (FLUSH) 0.9 %
10 SYRINGE (ML) INJECTION EVERY 12 HOURS SCHEDULED
Status: CANCELLED | OUTPATIENT
Start: 2023-04-18

## 2023-03-13 RX ORDER — CEFAZOLIN SODIUM 2 G/100ML
2 INJECTION, SOLUTION INTRAVENOUS ONCE
Status: CANCELLED | OUTPATIENT
Start: 2023-04-18 | End: 2023-03-13

## 2023-03-13 RX ORDER — SODIUM CHLORIDE, SODIUM LACTATE, POTASSIUM CHLORIDE, CALCIUM CHLORIDE 600; 310; 30; 20 MG/100ML; MG/100ML; MG/100ML; MG/100ML
100 INJECTION, SOLUTION INTRAVENOUS CONTINUOUS
Status: CANCELLED | OUTPATIENT
Start: 2023-04-18

## 2023-03-13 NOTE — PROGRESS NOTES
Age: 42 y.o.  Sex: female  :  1980  MRN: [id number]       REFERRING PHYSICIAN: Kimberly Rondon DO       Niecy Hooks presents to [facility] for right ovarian cyst that has increased in size over the last month. It was measured at 4.8cm in feb, then increased to 6.8 in march. This is associated with pelvic fullness and pain. She denies family hx breast ovarian or colon cancer. She is not UTD with mammogram but has been ordered.       Oncology/Hematology History Overview Note   Niecy Hooks is a 43 y/o female referred by Kimberly Rondon for a cyst of the right ovary.      • 23 :TVUS -  Uterus  uterus appears diffusely heterogeneous.  There are more well-defined masslike lesions within the myometrium. The largest measures 1.9 cm x 1.7 cm, suspected to represent leiomyomas. ET 2.8 mm. R ovary has a 4.8 x  4.6 x 5.4 cm hypoechoic lesion. There are apparent polypoid soft tissue extrusions along the wall of the lesion measuring up to 1.3 cm. Adjacent to this is largely anechoic mass measuring 2.0 cm x 3.5 cm x 4.2 cm.  L ovary there is a simple appearing  cystic focus mass 2.0 cm.   • 3/08/23: TVUS- Uterus 10.1 x 5.7 x 5.4 cm  ET 14.06 mm. R ovary 6.8 x 5.7 cm. 6.1 x 4.5 cm complex cyst with 2 hyperechoic areas within. Also contains 4.1 x 2.3 cm echo- free cyst with smooth borders  L ovary 2.6 x 1.5 cm. Multiple small fibroids identified measuring 1.9 x 1.1 cm appears to be anterior and intramural/subserosal and 2.0 x 2.5 cm appears to be posterior fundal subserosal/exophytic.        3/13/23: New Patient          Date            Value      Ref Range            Status  2023     20.1        0.0 - 38.1 U/mL          Final           Past Medical History:  Past Medical History:   Diagnosis Date   • Depression    • Injury of back    • Ovarian cyst    • Sciatic nerve pain    • Thyroid disease        Past Surgical History:  Past Surgical History:   Procedure Laterality Date   • BREAST  "SURGERY      implants   • CHOLECYSTECTOMY     • THYROID SURGERY          Current Meds:    Current Outpatient Medications:   •  ibuprofen (ADVIL,MOTRIN) 400 MG tablet, Take 200 mg by mouth Every 6 (Six) Hours As Needed for Mild Pain., Disp: , Rfl:   •  Iron, Ferrous Sulfate, 325 (65 Fe) MG tablet, Take 325 mg by mouth Daily., Disp: 90 tablet, Rfl: 1      ALLERGIES:  No Known Allergies      ROS:  CONSTITUTIONAL:  Denies fever or chills.   NEUROLOGIC:  Denies headache, focal weakness or sensory changes.   EYES:  Denies change in visual acuity.  HEENT:  Denies nasal congestion or sore throat.   RESPIRATORY:  Denies cough or shortness of breath.   CARDIOVASCULAR:  Denies chest pain or edema.   GI:  Denies abdominal pain, nausea, vomiting, bloody stools or diarrhea.   :  Denies dysuria, leaking or incontinence.  MUSCULOSKELETAL:  Denies back pain or joint pain.   INTEGUMENT:  Denies rash.   ENDOCRINE:  Denies polyuria or polydipsia.   LYMPHATIC:  Denies swollen glands or lymphedema.   PSYCHIATRIC:  Denies depression or anxiety.      PHYSICAL EXAM:  Vitals:    03/13/23 0902   BP: 129/75   Pulse: 86   Resp: 12   Temp: 99.3 °F (37.4 °C)   TempSrc: Temporal   SpO2: 100%   Weight: 69.5 kg (153 lb 3.2 oz)   Height: 175.9 cm (69.25\")   PainSc:   3   PainLoc: Groin  Comment: rt ovarian pain     Body mass index is 22.46 kg/m².  Current Status 3/13/2023   ECOG score 0       GEN: alert and oriented x 3, normal affect, well nourished and hydrated.  CARDIO: regular rate and rhythm.  PULM: Lungs CTA b.l, no RRW   ABD:Soft, nontender, nondistended.   GYN:  External genitalia normal, no lesions. Speculum with normal vagina, normal appearingcervix without lesions. Bimanual exam does not reveal any palpable lesions + right side adnexal fullness  EXT: No petechiae, bruising, rash, candida. No CCE.         Result Review :  The pertinent labs, images, and/or pathology as noted in the oncology history were reviewed independently and " discussed with the patient.   Jerri Woodward, DO   03/13/2023    [facility] LABS:    20  CEa<0.6  Prolactin elevated 25.6    [facility] IMAGING:  TVUS as noted above         ASSESSMENT :   • Right adnexal cystic mass 6.8cm         PLAN :  Perioperative mgmt: PAT only     The case was reviewed with the patient in detail, taking into consideration symptoms, laboratory results, imaging, pathology and physical exam findings.  At this point in time, I am recommending robotic assisted right salpingooophorectomy, possible hysterectomy, possible bilateral salpingooophorectomy, possible staging, possible laparotomy, dilation and curettage.     Risks, benefits, alternatives and indications to the procedure were reviewed in detail.    Risks of surgery include bleeding/hemorrhage which may require transfusion and associated transfusion risk (transfusion reaction, HCV, TRALI ); Infection, which may require antibiotic therapy or abscess drainage; Damage to surrounding internal organs (bowel, bladder, or urinary tract) which may result in obstruction or fistula; Nerve, or vascular injury which may result in numbness, pain, swelling or loss of strength. Risk of possible incomplete resolution of symptoms or failure to cure.  She understands that it is possible that intraoperative findings may warrant further treatment.  There is a low, but real, risk of unanticipated return to the OR for a problem associated with the surgery and a remote possibility of death.      • All questions were addressed and answered to the patient's satisfaction. She indicates understanding of these risks and desires to proceed. She wishes me to use my judgement to do the procedure that I feel is in her best interest. Surgical consents to be signed in Pre Op prior to surgery.   •             Jerri Woodward D.O  3/13/2023    Gynecologic Oncology   [address] [place] [address]  [phone] office

## 2023-03-14 ENCOUNTER — TELEPHONE (OUTPATIENT)
Dept: GYNECOLOGIC ONCOLOGY | Facility: CLINIC | Age: 43
End: 2023-03-14
Payer: COMMERCIAL

## 2023-03-14 NOTE — TELEPHONE ENCOUNTER
Spoke with patient and provided the following information, a copy has also been mailed to her. She verbalized understanding.    PAT 4/10 at 9:00 AM  SX 4/18 arriving at 8:00 AM  Post-op 4/28 at 8:45 AM

## 2023-03-15 ENCOUNTER — OFFICE VISIT (OUTPATIENT)
Dept: PODIATRY | Facility: CLINIC | Age: 43
End: 2023-03-15
Payer: COMMERCIAL

## 2023-03-15 VITALS
BODY MASS INDEX: 22.51 KG/M2 | HEIGHT: 69 IN | SYSTOLIC BLOOD PRESSURE: 117 MMHG | TEMPERATURE: 97.9 F | DIASTOLIC BLOOD PRESSURE: 78 MMHG | WEIGHT: 152 LBS | OXYGEN SATURATION: 100 % | HEART RATE: 61 BPM

## 2023-03-15 DIAGNOSIS — M72.2 PLANTAR FASCIITIS: ICD-10-CM

## 2023-03-15 DIAGNOSIS — M79.671 FOOT PAIN, RIGHT: Primary | ICD-10-CM

## 2023-03-15 PROCEDURE — 99203 OFFICE O/P NEW LOW 30 MIN: CPT | Performed by: PODIATRIST

## 2023-03-15 PROCEDURE — 20550 NJX 1 TENDON SHEATH/LIGAMENT: CPT | Performed by: PODIATRIST

## 2023-03-15 RX ORDER — TRIAMCINOLONE ACETONIDE 40 MG/ML
20 INJECTION, SUSPENSION INTRA-ARTICULAR; INTRAMUSCULAR ONCE
Status: COMPLETED | OUTPATIENT
Start: 2023-03-15 | End: 2023-03-15

## 2023-03-15 RX ORDER — DEXAMETHASONE SODIUM PHOSPHATE 4 MG/ML
2 INJECTION, SOLUTION INTRA-ARTICULAR; INTRALESIONAL; INTRAMUSCULAR; INTRAVENOUS; SOFT TISSUE ONCE
Status: COMPLETED | OUTPATIENT
Start: 2023-03-15 | End: 2023-03-15

## 2023-03-15 RX ORDER — BUPIVACAINE HYDROCHLORIDE 2.5 MG/ML
0.5 INJECTION, SOLUTION INFILTRATION; PERINEURAL ONCE
Status: COMPLETED | OUTPATIENT
Start: 2023-03-15 | End: 2023-03-15

## 2023-03-15 RX ADMIN — BUPIVACAINE HYDROCHLORIDE 0.5 ML: 2.5 INJECTION, SOLUTION INFILTRATION; PERINEURAL at 15:32

## 2023-03-15 RX ADMIN — TRIAMCINOLONE ACETONIDE 20 MG: 40 INJECTION, SUSPENSION INTRA-ARTICULAR; INTRAMUSCULAR at 15:31

## 2023-03-15 RX ADMIN — DEXAMETHASONE SODIUM PHOSPHATE 2 MG: 4 INJECTION, SOLUTION INTRA-ARTICULAR; INTRALESIONAL; INTRAMUSCULAR; INTRAVENOUS; SOFT TISSUE at 15:30

## 2023-03-15 NOTE — PROGRESS NOTES
[facility] - PODIATRY    Today's Date: 03/15/23    Patient Name: Niecy Hooks  MRN: [id number]  CSN: [id number]  PCP: Rosaline Robertson APRN  Referring Provider: Rosaline Robertson A*    SUBJECTIVE     Chief Complaint   Patient presents with   • Right Foot - Establish Care     Plantar faciitis      HPI: Niecy Hooks, lucy 42 y.o.female, comes to clinic.    New, Established, New Problem: New    Location: Right heel    Duration: Over the past year    Onset:  gradual    Nature:  achy, sharp, shooting    Stable, worsening, improving: Worsening    Aggravating factors:  Patient describes morning right heel pain as stabbing, burning, or aching. This pain usually subsides throughout the day, however it returns after periods of rest and sitting, when standing back up on their feet, and again the next morning.      Previous Treatment: Ice    Patient denies any fevers, chills, nausea, vomiting, shortness of breath, nor any other constitutional signs nor symptoms.    Patient relates upcoming ovary removal surgery at [facility] in April 2023.    Past Medical History:   Diagnosis Date   • Depression    • Injury of back    • Ovarian cyst    • Sciatic nerve pain    • Thyroid disease      Past Surgical History:   Procedure Laterality Date   • BREAST SURGERY      implants   • CHOLECYSTECTOMY     • THYROID SURGERY       Family History   Problem Relation Age of Onset   • Hypertension Father    • Hypertension Paternal Grandfather    • Diabetes Paternal Grandfather    • Diabetes Maternal Aunt    • Diabetes Maternal Uncle    • Cancer Paternal Uncle         stomach   • Breast cancer Neg Hx    • Ovarian cancer Neg Hx    • Uterine cancer Neg Hx    • Prostate cancer Neg Hx    • Colon cancer Neg Hx    • Deep vein thrombosis Neg Hx    • Pulmonary embolism Neg Hx      Social History     Socioeconomic History   • Marital status:    Tobacco Use   • Smoking status: Never     Passive exposure: Never   •  Smokeless tobacco: Never   Vaping Use   • Vaping Use: Never used   Substance and Sexual Activity   • Alcohol use: Not Currently     Comment: occasion   • Drug use: Never   • Sexual activity: Not Currently     Partners: Male     Birth control/protection: None     Comment:  overseas long term, [place] visits q 6mo     No Known Allergies  Current Outpatient Medications   Medication Sig Dispense Refill   • HYDROcodone-acetaminophen (Norco) 5-325 MG per tablet Take 1 tablet by mouth Every 6 (Six) Hours As Needed for Moderate Pain. 20 tablet 0   • ibuprofen (ADVIL,MOTRIN) 400 MG tablet Take 200 mg by mouth Every 6 (Six) Hours As Needed for Mild Pain.     • Iron, Ferrous Sulfate, 325 (65 Fe) MG tablet Take 325 mg by mouth Daily. 90 tablet 1     Current Facility-Administered Medications   Medication Dose Route Frequency Provider Last Rate Last Admin   • bupivacaine (MARCAINE) 0.25 % injection 0.5 mL  0.5 mL Injection Once Edvin Rivera DPM       • dexamethasone sodium phosphate injection 2 mg  2 mg Intramuscular Once Edvin Rivera DPM       • triamcinolone acetonide (KENALOG-40) injection 20 mg  20 mg Intramuscular Once Edvin Rivera DPM         Review of Systems   Constitutional: Negative.    Musculoskeletal:        Right heel pain   All other systems reviewed and are negative.      OBJECTIVE     Vitals:    03/15/23 1504   BP: 117/78   Pulse: 61   Temp: 97.9 °F (36.6 °C)   SpO2: 100%       PHYSICAL EXAM     Foot/Ankle Exam:       General:   Appearance: appears stated age and healthy    Orientation: AAOx3    Affect: appropriate    Gait: unimpaired    Shoe Gear:  Casual shoes    VASCULAR      Right Foot Vascularity   Normal vascular exam    Dorsalis pedis:  2+  Posterior tibial:  2+  Skin Temperature: warm    Edema Grading:  None  CFT:  < 3 seconds  Pedal Hair Growth:  Present  Varicosities: none       Left Foot Vascularity   Normal vascular exam    Dorsalis pedis:  2+  Posterior tibial:   2+  Skin Temperature: warm    Edema Grading:  None  CFT:  < 3 seconds  Pedal Hair Growth:  Present  Varicosities: none        NEUROLOGIC     Right Foot Neurologic   Normal sensation    Light touch sensation:  Normal  Vibratory sensation:  Normal  Hot/Cold sensation: normal    Protective Sensation using [place] Monofilament:  10     Left Foot Neurologic   Normal sensation    Light touch sensation:  Normal  Vibratory sensation:  Normal  Hot/cold sensation: normal    Protective Sensation using [place] Monofilament:  10     MUSCULOSKELETAL      Right Foot Musculoskeletal   Tenderness: plantar fascia and plantar heel       MUSCLE STRENGTH     Right Foot Muscle Strength   Foot dorsiflexion:  4  Foot plantar flexion:  4  Foot inversion:  4  Foot eversion:  4     Left Foot Muscle Strength   Foot dorsiflexion:  4  Foot plantar flexion:  4  Foot inversion:  4  Foot eversion:  4     RANGE OF MOTION      Right Foot Range of Motion   Foot and ankle ROM within normal limits       Left Foot Range of Motion   Foot and ankle ROM within normal limits       DERMATOLOGIC     Right Foot Dermatologic   Skin: skin intact    Nails: normal       Left Foot Dermatologic   Skin: skin intact    Nails: normal        ASSESSMENT/PLAN     Diagnoses and all orders for this visit:    1. Foot pain, right (Primary)    2. Plantar fasciitis  -     dexamethasone sodium phosphate injection 2 mg  -     bupivacaine (MARCAINE) 0.25 % injection 0.5 mL  -     triamcinolone acetonide (KENALOG-40) injection 20 mg      Comprehensive lower extremity examination and evaluation was performed.    Discussed findings and treatment plan including risks, benefits, and treatment options with patient in detail. Patient agreed with treatment plan.    Treatment Options discussed:  - no treatment at all  - change in shoegear  - change in activities  - RICE therapy  - arch support  - NSAIDs  - PO steroids  - injectable steroids    Plantar Fasciitis  Injection  Date/Time: 03/15/2023  Performed by: Edvin Rivera DPM  Authorized by: Edvin Rivera DPM     Consent: Verbal consent obtained. Written consent obtained.  Risks and benefits: risks, benefits and alternatives were discussed  Consent given by: patient  Patient identity confirmed: verbally with patient  Indications: pain relief    Injection site: Right heel.    Sedation:  Patient sedated: no    Patient position: sitting  Needle size: 27 G  Local anesthetic: 0.5 ml 0.25% Marcaine plain, 0.5 ml Kenalog 40 mg/ml, and 0.5 ml Decadron 4 mg/mL.   Outcome: pain improved  Patient tolerance: Patient tolerated the procedure well with no immediate complications    Patient may begin to weight bear as tolerated in supportive shoes.  No impact activities for two weeks.  After that time, the patient may increase activities as tolerated. Patient states understanding and agreement with this plan.    An After Visit Summary was printed and given to the patient at discharge, including (if requested) any available informative/educational handouts regarding diagnosis, treatment, or medications. All questions were answered to patient/family satisfaction. Should symptoms fail to improve or worsen they agree to call or return to clinic or to go to the Emergency Department. Discussed the importance of following up with any needed screening tests/labs/specialist appointments and any requested follow-up recommended by me today. Importance of maintaining follow-up discussed and patient accepts that missed appointments can delay diagnosis and potentially lead to worsening of conditions.    Return if symptoms worsen or fail to improve., or sooner if acute issues arise.    This document has been electronically signed by Edvin Rivera DPM on March 15, 2023 15:27 EDT

## 2023-03-17 ENCOUNTER — OFFICE VISIT (OUTPATIENT)
Dept: INTERNAL MEDICINE | Facility: CLINIC | Age: 43
End: 2023-03-17
Payer: COMMERCIAL

## 2023-03-17 ENCOUNTER — LAB (OUTPATIENT)
Dept: OBSTETRICS AND GYNECOLOGY | Facility: CLINIC | Age: 43
End: 2023-03-17
Payer: COMMERCIAL

## 2023-03-17 VITALS
HEIGHT: 69 IN | SYSTOLIC BLOOD PRESSURE: 116 MMHG | OXYGEN SATURATION: 99 % | WEIGHT: 150 LBS | HEART RATE: 89 BPM | DIASTOLIC BLOOD PRESSURE: 76 MMHG | TEMPERATURE: 97.4 F | BODY MASS INDEX: 22.22 KG/M2

## 2023-03-17 DIAGNOSIS — M25.561 POSTERIOR RIGHT KNEE PAIN: Primary | ICD-10-CM

## 2023-03-17 DIAGNOSIS — N92.1 MENOMETRORRHAGIA: Primary | ICD-10-CM

## 2023-03-17 DIAGNOSIS — N83.201 CYST OF RIGHT OVARY: ICD-10-CM

## 2023-03-17 DIAGNOSIS — D50.9 IRON DEFICIENCY ANEMIA, UNSPECIFIED IRON DEFICIENCY ANEMIA TYPE: ICD-10-CM

## 2023-03-17 PROCEDURE — 99213 OFFICE O/P EST LOW 20 MIN: CPT | Performed by: NURSE PRACTITIONER

## 2023-03-17 NOTE — PROGRESS NOTES
Chief Complaint  Abdominal Cramping (Duration of 4-6 months )    Subjective          Niecy Hooks presents to [facility]  Abdominal Pain  This is a new problem. Episode onset: 3-4 months around RLQ, worse when on periods; has ovarian cyst that she states is getting removed per GYN. The problem occurs intermittently. The pain is located in the RLQ. The quality of the pain is colicky. The abdominal pain does not radiate. Pertinent negatives include no anorexia, arthralgias, belching, constipation, diarrhea, dysuria, fever, flatus, frequency, headaches, hematochezia, hematuria, melena, myalgias, nausea, vomiting or weight loss. She has tried acetaminophen for the symptoms.   Knee Pain   The incident occurred 2 days ago. There was no injury mechanism. The pain is present in the right knee. The pain has been constant since onset. Pertinent negatives include no inability to bear weight, loss of motion, loss of sensation, muscle weakness, numbness or tingling. The symptoms are aggravated by palpation, weight bearing and movement. She has tried rest for the symptoms. The treatment provided no relief.     Dr. Rondon's note   Prolactin is just mildly elevated.  I suspect that this could be due to a hot shower or any nipple stimulation.  I recommend this be repeated fasting in the next week or 2 weeks.  I have placed the order.  No hot showers or nipple stimulation prior to blood work.  Fasting  Current Outpatient Medications   Medication Instructions   • HYDROcodone-acetaminophen (Norco) 5-325 MG per tablet 1 tablet, Oral, Every 6 Hours PRN   • ibuprofen (ADVIL,MOTRIN) 200 mg, Every 6 Hours PRN   • Iron (Ferrous Sulfate) 325 mg, Oral, Daily       The following portions of the patient's history were reviewed and updated as appropriate: allergies, current medications, past family history, past medical history, past social history, past surgical history, and problem  "list.    Objective   Vital Signs:   /76 (BP Location: Left arm, Patient Position: Sitting, Cuff Size: Adult)   Pulse 89   Temp 97.4 °F (36.3 °C) (Temporal)   Ht 175.9 cm (69.25\")   Wt 68 kg (150 lb)   SpO2 99%   BMI 21.99 kg/m²     Wt Readings from Last 3 Encounters:   03/17/23 68 kg (150 lb)   03/15/23 68.9 kg (152 lb)   03/13/23 69.5 kg (153 lb 3.2 oz)     BP Readings from Last 3 Encounters:   03/17/23 116/76   03/15/23 117/78   03/13/23 129/75     Physical Exam  Musculoskeletal:      Right knee: Swelling present. Tenderness present.        Legs:         Appearance: No acute distress, well-nourished  Head: normocephalic, atraumatic  Eyes: extraocular movements intact, no scleral icterus, no conjunctival injection  Ears, Nose, and Throat: external ears normal, nares patent, moist mucous membranes, tympanic membranes clear bilaterally. Tonsils within normal limits. Oropharynx clear.   Cardiovascular: regular rate and rhythm. no murmurs, rubs, or gallops. no edema  Respiratory: breathing comfortably, symmetric chest rise, clear to auscultation bilaterally. No wheezes, rales, or rhonchi.  Neuro: alert and moves all extremities equally  Lymph: no occipital, cervical, submandibular,or supraclavicular lymphadenopathy.      Result Review :   The following data was reviewed by: BERNARD Douglas on 03/17/2023:  Common labs    Common Labs 1/12/23 1/12/23 1/12/23 1/12/23 2/18/23 2/18/23    1508 1508 1508 1508 1257 1257   Glucose  126 (A)   95    BUN  8   8    Creatinine  0.56 (A)   0.47 (A)    Sodium  138   137    Potassium  3.9   3.7    Chloride  102   103    Calcium  9.0   9.1    Albumin  4.6   4.2    Total Bilirubin  <0.2   0.3    Alkaline Phosphatase  62   54    AST (SGOT)  21   37 (A)    ALT (SGPT)  13   35 (A)    WBC 6.96     6.29   Hemoglobin 8.9 (A)     10.5 (A)   Hematocrit 30.3 (A)     33.4 (A)   Platelets 343     332   Total Cholesterol   150      Triglycerides   85      HDL Cholesterol   61 " (A)      LDL Cholesterol    73      Hemoglobin A1C    5.20     (A) Abnormal value                   Lab Results   Component Value Date    SARSANTIGEN Not Detected 02/18/2022    COVID19 Not Detected 05/28/2022    RAPFLUA Negative 02/18/2022    RAPFLUB Negative 02/18/2022    RAPSCRN Negative 02/18/2022    BILIRUBINUR Negative 02/18/2023          Assessment and Plan    Diagnoses and all orders for this visit:    1. Posterior right knee pain (Primary)  -     Duplex Venous Lower Extremity - Right CAR; Future    2. Iron deficiency anemia, unspecified iron deficiency anemia type    3. Cyst of right ovary  Comments:  has surgery scheduled   Overview:  March 2023 [facility]  Right ovary with 6.1 x 4.5 cm complex cyst with 2 hyperechoic areas within.    Right ovary also contains 4.1 x 2.3 cm echo-free cyst with smooth borders.    Feb 2023CA 125 wnl 20.1           [facility] - ED    There are no discontinued medications.       Follow Up   Return if symptoms worsen or fail to improve.  Patient was given instructions and counseling regarding her condition or for health maintenance advice. Please see specific information pulled into the AVS if appropriate.       Rosaline Robertson, APRN  03/17/23  14:38 EDT

## 2023-03-20 ENCOUNTER — TELEPHONE (OUTPATIENT)
Dept: OBSTETRICS AND GYNECOLOGY | Facility: CLINIC | Age: 43
End: 2023-03-20
Payer: COMMERCIAL

## 2023-03-20 ENCOUNTER — TELEPHONE (OUTPATIENT)
Dept: GYNECOLOGIC ONCOLOGY | Facility: CLINIC | Age: 43
End: 2023-03-20
Payer: COMMERCIAL

## 2023-03-20 NOTE — TELEPHONE ENCOUNTER
Caller: Niecy Hooks    Relationship: Self    Best call back number: [phone]    What is the best time to reach you: ASAP    Who are you requesting to speak with (clinical staff, provider,  specific staff member): SURGERY SCHEDULING    What was the call regarding: PT WANTS TO SEE IF SHE CAN GET HER SURGERY SCHEDULED FOR SOONER, SHE IS IN A LOT OF PAIN AND TAKING PAIN MEDS EVERY 6 HRS.    Do you require a callback: YES PLEASE CALL TO ADVISE.  PLEASE LEAVE VM IF NO ANSWER.

## 2023-03-21 ENCOUNTER — LAB (OUTPATIENT)
Dept: OBSTETRICS AND GYNECOLOGY | Facility: CLINIC | Age: 43
End: 2023-03-21
Payer: COMMERCIAL

## 2023-03-21 ENCOUNTER — LAB (OUTPATIENT)
Dept: LAB | Facility: HOSPITAL | Age: 43
End: 2023-03-21
Payer: COMMERCIAL

## 2023-03-21 DIAGNOSIS — E22.1 HYPERPROLACTINEMIA: ICD-10-CM

## 2023-03-21 DIAGNOSIS — N83.8 MASS OF RIGHT OVARY: ICD-10-CM

## 2023-03-21 PROCEDURE — 84146 ASSAY OF PROLACTIN: CPT

## 2023-03-22 NOTE — TELEPHONE ENCOUNTER
Spoke with patient and provided the following rescheduled appointments. Patient verbalized understanding.    PAT 3/28 at 10:30 AM  SX 4/6 arriving at 5:30 AM  Post-Op 4/19 at 11:00 AM

## 2023-03-23 ENCOUNTER — TELEPHONE (OUTPATIENT)
Dept: GYNECOLOGIC ONCOLOGY | Facility: CLINIC | Age: 43
End: 2023-03-23
Payer: COMMERCIAL

## 2023-03-23 DIAGNOSIS — R10.2 PELVIC PAIN: ICD-10-CM

## 2023-03-23 RX ORDER — HYDROCODONE BITARTRATE AND ACETAMINOPHEN 5; 325 MG/1; MG/1
1 TABLET ORAL EVERY 6 HOURS PRN
Qty: 20 TABLET | Refills: 0 | Status: SHIPPED | OUTPATIENT
Start: 2023-03-23 | End: 2023-04-03 | Stop reason: SDUPTHER

## 2023-03-23 NOTE — TELEPHONE ENCOUNTER
Called patient back and gave her the number for PAT. Patient stated that she was going to call and get her test time changed for the same day.

## 2023-03-23 NOTE — TELEPHONE ENCOUNTER
Caller: Niecy Hooks    Relationship to patient: Self    Best call back number: [phone]    Chief complaint: PATIENT WANTED TO SEE IF THEY CAN MOVE HER PAT APPOINTMENT TO AROUND 3PM SAME DAY

## 2023-03-24 ENCOUNTER — TELEPHONE (OUTPATIENT)
Dept: GYNECOLOGIC ONCOLOGY | Age: 43
End: 2023-03-24

## 2023-03-24 NOTE — TELEPHONE ENCOUNTER
Caller: Niecy Hooks    Relationship: Self    Best call back number: [phone]    What is the best time to reach you: ANYTIME    Who are you requesting to speak with (clinical staff, provider,  specific staff member): CLINICAL    What was the call regarding: PT IS CALLING FOR THE CPT CODE FOR PROCEDURE SCHEDULED ON 4-6-2023 TO GIVE TO HER INSURANCE CO    Do you require a callback: YES PLEASE

## 2023-03-27 ENCOUNTER — TELEPHONE (OUTPATIENT)
Dept: OBSTETRICS AND GYNECOLOGY | Facility: CLINIC | Age: 43
End: 2023-03-27
Payer: COMMERCIAL

## 2023-03-27 NOTE — TELEPHONE ENCOUNTER
Caller: Niecy Hooks    Relationship: Self    Best call back number: [phone]    What was the call regarding: PT CALLED IN TO FOLLOW UP ON HER MOST RECENT LAB RESULTS.     Do you require a callback: YES

## 2023-03-28 ENCOUNTER — TELEPHONE (OUTPATIENT)
Dept: OBSTETRICS AND GYNECOLOGY | Facility: CLINIC | Age: 43
End: 2023-03-28
Payer: COMMERCIAL

## 2023-03-28 ENCOUNTER — PRE-ADMISSION TESTING (OUTPATIENT)
Dept: PREADMISSION TESTING | Facility: HOSPITAL | Age: 43
End: 2023-03-28
Payer: COMMERCIAL

## 2023-03-28 ENCOUNTER — HOSPITAL ENCOUNTER (OUTPATIENT)
Dept: GENERAL RADIOLOGY | Facility: HOSPITAL | Age: 43
Discharge: HOME | End: 2023-03-28
Payer: COMMERCIAL

## 2023-03-28 VITALS
HEIGHT: 68 IN | SYSTOLIC BLOOD PRESSURE: 128 MMHG | HEART RATE: 75 BPM | TEMPERATURE: 97.3 F | OXYGEN SATURATION: 100 % | BODY MASS INDEX: 22.58 KG/M2 | DIASTOLIC BLOOD PRESSURE: 80 MMHG | RESPIRATION RATE: 16 BRPM | WEIGHT: 149 LBS

## 2023-03-28 DIAGNOSIS — N83.8 MASS OF RIGHT OVARY: ICD-10-CM

## 2023-03-28 LAB
ANION GAP SERPL CALCULATED.3IONS-SCNC: 11.4 MMOL/L (ref 5–15)
APTT PPP: 32.9 SECONDS (ref 22.7–35.4)
B-HCG UR QL: NEGATIVE
BASOPHILS # BLD AUTO: 0.07 10*3/MM3 (ref 0–0.2)
BASOPHILS NFR BLD AUTO: 1 % (ref 0–1.5)
BILIRUB UR QL STRIP: NEGATIVE
BUN SERPL-MCNC: 12 MG/DL (ref 6–20)
BUN/CREAT SERPL: 16 (ref 7–25)
CALCIUM SPEC-SCNC: 9.4 MG/DL (ref 8.6–10.5)
CHLORIDE SERPL-SCNC: 102 MMOL/L (ref 98–107)
CLARITY UR: CLEAR
CO2 SERPL-SCNC: 25.6 MMOL/L (ref 22–29)
COLOR UR: YELLOW
CREAT SERPL-MCNC: 0.75 MG/DL (ref 0.57–1)
DEPRECATED RDW RBC AUTO: 56 FL (ref 37–54)
EGFRCR SERPLBLD CKD-EPI 2021: 100.8 ML/MIN/1.73
EOSINOPHIL # BLD AUTO: 0.37 10*3/MM3 (ref 0–0.4)
EOSINOPHIL NFR BLD AUTO: 5.3 % (ref 0.3–6.2)
ERYTHROCYTE [DISTWIDTH] IN BLOOD BY AUTOMATED COUNT: 18.4 % (ref 12.3–15.4)
GLUCOSE SERPL-MCNC: 85 MG/DL (ref 65–99)
GLUCOSE UR STRIP-MCNC: NEGATIVE MG/DL
HCT VFR BLD AUTO: 35.9 % (ref 34–46.6)
HGB BLD-MCNC: 11.1 G/DL (ref 12–15.9)
HGB UR QL STRIP.AUTO: NEGATIVE
IMM GRANULOCYTES # BLD AUTO: 0.02 10*3/MM3 (ref 0–0.05)
IMM GRANULOCYTES NFR BLD AUTO: 0.3 % (ref 0–0.5)
INR PPP: 1.03 (ref 0.9–1.1)
KETONES UR QL STRIP: NEGATIVE
LEUKOCYTE ESTERASE UR QL STRIP.AUTO: NEGATIVE
LYMPHOCYTES # BLD AUTO: 2.27 10*3/MM3 (ref 0.7–3.1)
LYMPHOCYTES NFR BLD AUTO: 32.4 % (ref 19.6–45.3)
MCH RBC QN AUTO: 26.2 PG (ref 26.6–33)
MCHC RBC AUTO-ENTMCNC: 30.9 G/DL (ref 31.5–35.7)
MCV RBC AUTO: 84.7 FL (ref 79–97)
MONOCYTES # BLD AUTO: 0.6 10*3/MM3 (ref 0.1–0.9)
MONOCYTES NFR BLD AUTO: 8.6 % (ref 5–12)
NEUTROPHILS NFR BLD AUTO: 3.67 10*3/MM3 (ref 1.7–7)
NEUTROPHILS NFR BLD AUTO: 52.4 % (ref 42.7–76)
NITRITE UR QL STRIP: NEGATIVE
NRBC BLD AUTO-RTO: 0 /100 WBC (ref 0–0.2)
PH UR STRIP.AUTO: 5.5 [PH] (ref 5–8)
PLATELET # BLD AUTO: 276 10*3/MM3 (ref 140–450)
PMV BLD AUTO: 10.2 FL (ref 6–12)
POTASSIUM SERPL-SCNC: 3.9 MMOL/L (ref 3.5–5.2)
PROT UR QL STRIP: NEGATIVE
PROTHROMBIN TIME: 13.7 SECONDS (ref 11.7–14.2)
QT INTERVAL: 403 MS
RBC # BLD AUTO: 4.24 10*6/MM3 (ref 3.77–5.28)
SODIUM SERPL-SCNC: 139 MMOL/L (ref 136–145)
SP GR UR STRIP: 1.02 (ref 1–1.03)
UROBILINOGEN UR QL STRIP: NORMAL
WBC NRBC COR # BLD: 7 10*3/MM3 (ref 3.4–10.8)

## 2023-03-28 PROCEDURE — 93005 ELECTROCARDIOGRAM TRACING: CPT

## 2023-03-28 PROCEDURE — 36415 COLL VENOUS BLD VENIPUNCTURE: CPT

## 2023-03-28 PROCEDURE — 85025 COMPLETE CBC W/AUTO DIFF WBC: CPT

## 2023-03-28 PROCEDURE — 71045 X-RAY EXAM CHEST 1 VIEW: CPT

## 2023-03-28 PROCEDURE — 85610 PROTHROMBIN TIME: CPT

## 2023-03-28 PROCEDURE — 81025 URINE PREGNANCY TEST: CPT

## 2023-03-28 PROCEDURE — 81003 URINALYSIS AUTO W/O SCOPE: CPT

## 2023-03-28 PROCEDURE — 93010 ELECTROCARDIOGRAM REPORT: CPT | Performed by: INTERNAL MEDICINE

## 2023-03-28 PROCEDURE — 85730 THROMBOPLASTIN TIME PARTIAL: CPT

## 2023-03-28 PROCEDURE — 80048 BASIC METABOLIC PNL TOTAL CA: CPT

## 2023-03-28 NOTE — DISCHARGE INSTRUCTIONS
Take the following medications the morning of surgery:    NO MEDS AM OF SURGERY    Arrive at 6:00    If you are on prescription narcotic pain medication to control your pain you may also take that medication the morning of surgery.    General Instructions:  Do not eat solid food after midnight the night before surgery.  You may drink clear liquids day of surgery but must stop at least one hour before your hospital arrival time.  It is beneficial for you to have a clear drink that contains carbohydrates the day of surgery.  We suggest a 12 to 20 ounce bottle of Gatorade or Powerade for non-diabetic patients or a 12 to 20 ounce bottle of G2 or Powerade Zero for diabetic patients. (Pediatric patients, are not advised to drink a 12 to 20 ounce carbohydrate drink)    Clear liquids are liquids you can see through.  Nothing red in color.     Plain water                               Sports drinks  Sodas                                   Gelatin (Jell-O)  Fruit juices without pulp such as white grape juice and apple juice  Popsicles that contain no fruit or yogurt  Tea or coffee (no cream or milk added)  Gatorade / Powerade  G2 / Powerade Zero    Patients who avoid smoking, chewing tobacco and alcohol for 4 weeks prior to surgery have a reduced risk of post-operative complications.  Quit smoking as many days before surgery as you can.  Do not smoke, use chewing tobacco or drink alcohol the day of surgery.   If applicable bring your C-PAP/ BI-PAP machine.  Bring any papers given to you in the doctor’s office.  Wear clean comfortable clothes.  Do not wear contact lenses, false eyelashes or make-up.  Bring a case for your glasses.   Bring crutches or walker if applicable.  Remove all piercings.  Leave jewelry and any other valuables at home.  Hair extensions with metal clips must be removed prior to surgery.  The Pre-Admission Testing nurse will instruct you to bring medications if unable to obtain an accurate list in  Pre-Admission Testing.          Preventing a Surgical Site Infection:  For 2 to 3 days before surgery, avoid shaving with a razor because the razor can irritate skin and make it easier to develop an infection.    Any areas of open skin can increase the risk of a post-operative wound infection by allowing bacteria to enter and travel throughout the body.  Notify your surgeon if you have any skin wounds / rashes even if it is not near the expected surgical site.  The area will need assessed to determine if surgery should be delayed until it is healed.  The night prior to surgery shower using a fresh bar of anti-bacterial soap (such as Dial) and clean washcloth.  Sleep in a clean bed with clean clothing.  Do not allow pets to sleep with you.  Shower on the morning of surgery using a fresh bar of anti-bacterial soap (such as Dial) and clean washcloth.  Dry with a clean towel and dress in clean clothing.  Ask your surgeon if you will be receiving antibiotics prior to surgery.  Make sure you, your family, and all healthcare providers clean their hands with soap and water or an alcohol based hand  before caring for you or your wound.    Day of surgery:  Your arrival time is approximately two hours before your scheduled surgery time.  Upon arrival, a Pre-op nurse and Anesthesiologist will review your health history, obtain vital signs, and answer questions you may have.  The only belongings needed at this time will be a list of your home medications and if applicable your C-PAP/BI-PAP machine.  A Pre-op nurse will start an IV and you may receive medication in preparation for surgery, including something to help you relax.     Please be aware that surgery does come with discomfort.  We want to make every effort to control your discomfort so please discuss any uncontrolled symptoms with your nurse.   Your doctor will most likely have prescribed pain medications.      If you are going home after surgery you will  receive individualized written care instructions before being discharged.  A responsible adult must drive you to and from the hospital on the day of your surgery and stay with you for 24 hours.  Discharge prescriptions can be filled by the hospital pharmacy during regular pharmacy hours.  If you are having surgery late in the day/evening your prescription may be e-prescribed to your pharmacy.  Please verify your pharmacy hours or chose a 24 hour pharmacy to avoid not having access to your prescription because your pharmacy has closed for the day.    If you are staying overnight following surgery, you will be transported to your hospital room following the recovery period.  [facility] has all private rooms.    If you have any questions please call Pre-Admission Testing at [phone].  Deductibles and co-payments are collected on the day of service. Please be prepared to pay the required co-pay, deductible or deposit on the day of service as defined by your plan.    Call your surgeon immediately if you experience any of the following symptoms:  Sore Throat  Shortness of Breath or difficulty breathing  Cough  Chills  Body soreness or muscle pain  Headache  Fever  New loss of taste or smell  Do not arrive for your surgery ill.  Your procedure will need to be rescheduled to another time.  You will need to call your physician before the day of surgery to avoid any unnecessary exposure to hospital staff as well as other patients.

## 2023-03-28 NOTE — TELEPHONE ENCOUNTER
03/27/23 Patient came in and dropped off fmla paperwork and paid the $25 fee to have office fill out and have provider sign her fmla paperwork.  Today find out - our office referred her to another provider for her sx, unable to do the paperwork, the aptient paid for.  I attempted to generate a refund, wasn't able - called the billing office [phone].  However, per the billing office (Liza) I  needed to call the refund line  to have the payment refunded.  Due to her payment was applied to other dos on her account. *When payment was taken in Epic, it was done as form fee (in the drop down) & it was noted on the transaction the $25 was for FMLA paperwork for OBGYN.  Called the refund line  received voicemail for Pili Olivas - left voicemail of needing a refund for thie patient and why.  On her voicemail, Ms. Olivas requested to also, email her, as well.  *Sent email, also.  CC: Sheila Neri.  As, Sheila had called the patient and informed her of this and we would take care of getting her refunded, for the $25 she paid.

## 2023-03-29 NOTE — TELEPHONE ENCOUNTER
Received email back from  Refund Department (Pili Olivas) - the patient was refunded the $25 via [employer].  Patient was notified.

## 2023-03-29 NOTE — TELEPHONE ENCOUNTER
Patient was notified - received her voicemail - left message explaining everything and that she was refunded the $25 to her credit card.

## 2023-03-30 ENCOUNTER — HOSPITAL ENCOUNTER (OUTPATIENT)
Dept: CARDIOLOGY | Facility: HOSPITAL | Age: 43
Discharge: HOME | End: 2023-03-30
Admitting: NURSE PRACTITIONER
Payer: COMMERCIAL

## 2023-03-30 DIAGNOSIS — M25.561 POSTERIOR RIGHT KNEE PAIN: ICD-10-CM

## 2023-03-30 LAB
BH CV LOWER VASCULAR LEFT COMMON FEMORAL AUGMENT: NORMAL
BH CV LOWER VASCULAR LEFT COMMON FEMORAL COMPETENT: NORMAL
BH CV LOWER VASCULAR LEFT COMMON FEMORAL COMPRESS: NORMAL
BH CV LOWER VASCULAR LEFT COMMON FEMORAL PHASIC: NORMAL
BH CV LOWER VASCULAR LEFT COMMON FEMORAL SPONT: NORMAL
BH CV LOWER VASCULAR RIGHT COMMON FEMORAL AUGMENT: NORMAL
BH CV LOWER VASCULAR RIGHT COMMON FEMORAL COMPETENT: NORMAL
BH CV LOWER VASCULAR RIGHT COMMON FEMORAL COMPRESS: NORMAL
BH CV LOWER VASCULAR RIGHT COMMON FEMORAL PHASIC: NORMAL
BH CV LOWER VASCULAR RIGHT COMMON FEMORAL SPONT: NORMAL
BH CV LOWER VASCULAR RIGHT DISTAL FEMORAL COMPRESS: NORMAL
BH CV LOWER VASCULAR RIGHT GASTRONEMIUS COMPRESS: NORMAL
BH CV LOWER VASCULAR RIGHT GREATER SAPH AK COMPRESS: NORMAL
BH CV LOWER VASCULAR RIGHT GREATER SAPH BK COMPRESS: NORMAL
BH CV LOWER VASCULAR RIGHT LESSER SAPH COMPRESS: NORMAL
BH CV LOWER VASCULAR RIGHT MID FEMORAL AUGMENT: NORMAL
BH CV LOWER VASCULAR RIGHT MID FEMORAL COMPETENT: NORMAL
BH CV LOWER VASCULAR RIGHT MID FEMORAL COMPRESS: NORMAL
BH CV LOWER VASCULAR RIGHT MID FEMORAL PHASIC: NORMAL
BH CV LOWER VASCULAR RIGHT MID FEMORAL SPONT: NORMAL
BH CV LOWER VASCULAR RIGHT PERONEAL COMPRESS: NORMAL
BH CV LOWER VASCULAR RIGHT POPLITEAL AUGMENT: NORMAL
BH CV LOWER VASCULAR RIGHT POPLITEAL COMPETENT: NORMAL
BH CV LOWER VASCULAR RIGHT POPLITEAL COMPRESS: NORMAL
BH CV LOWER VASCULAR RIGHT POPLITEAL PHASIC: NORMAL
BH CV LOWER VASCULAR RIGHT POPLITEAL SPONT: NORMAL
BH CV LOWER VASCULAR RIGHT POSTERIOR TIBIAL COMPRESS: NORMAL
BH CV LOWER VASCULAR RIGHT PROXIMAL FEMORAL COMPRESS: NORMAL
MACROPROLACTIN/PROLACTIN MFR SERPL: 18 %
MAXIMAL PREDICTED HEART RATE: 176 BPM
PROLACTIN SERPL-MCNC: 11.8 NG/ML
PROLACTIN.MONOMERIC SERPL-MCNC: 9.64 NG/ML
STRESS TARGET HR: 150 BPM

## 2023-03-30 PROCEDURE — 93971 EXTREMITY STUDY: CPT

## 2023-03-31 ENCOUNTER — TELEPHONE (OUTPATIENT)
Dept: GYNECOLOGIC ONCOLOGY | Facility: CLINIC | Age: 43
End: 2023-03-31
Payer: COMMERCIAL

## 2023-03-31 NOTE — TELEPHONE ENCOUNTER
Caller: Niecy Hooks    Relationship: Self    Best call back number: [phone]    What was the call regarding: PATIENT CALLED WANTED TO KNOW IF SHE CAN STILL GET SURGERY DONE IF SHE IS ON HER MENSTRAL CYCLE    Do you require a callback: YES

## 2023-03-31 NOTE — TELEPHONE ENCOUNTER
Spoke with patient letting her know that she would be able to proceed with surgery. Patient stated verbal understanding.

## 2023-04-03 DIAGNOSIS — R10.2 PELVIC PAIN: ICD-10-CM

## 2023-04-03 NOTE — TELEPHONE ENCOUNTER
Caller: Niecy Hooks    Relationship: Self    Best call back number: [phone]    Requested Prescriptions:   Requested Prescriptions     Pending Prescriptions Disp Refills   • HYDROcodone-acetaminophen (Norco) 5-325 MG per tablet 20 tablet 0     Sig: Take 1 tablet by mouth Every 6 (Six) Hours As Needed for Moderate Pain.        Pharm[facility] where request should be sent: [id number] - [place], [place] - [address] [facility] AT [facility] OF [address]/[facility] [phone] [facility] [phone] FX     Last office visit with prescribing clinician: 3/13/2023   Last telemedicine visit with prescribing clinician: 4/19/2023   Next office visit with prescribing clinician: 4/19/2023         Does the patient have less than a 3 day supply:  [x] Yes  [] No    Would you like a call back once the refill request has been completed: [x] Yes [] No    If the office needs to give you a call back, can they leave a voicemail: [x] Yes [] No    Juana Trotter Rep   04/03/23 09:52 EDT

## 2023-04-04 RX ORDER — HYDROCODONE BITARTRATE AND ACETAMINOPHEN 5; 325 MG/1; MG/1
1 TABLET ORAL EVERY 6 HOURS PRN
Qty: 20 TABLET | Refills: 0 | Status: ON HOLD | OUTPATIENT
Start: 2023-04-04 | End: 2023-04-06 | Stop reason: SDUPTHER

## 2023-04-06 ENCOUNTER — ANESTHESIA (OUTPATIENT)
Dept: PERIOP | Facility: HOSPITAL | Age: 43
End: 2023-04-06
Payer: COMMERCIAL

## 2023-04-06 ENCOUNTER — ANESTHESIA EVENT (OUTPATIENT)
Dept: PERIOP | Facility: HOSPITAL | Age: 43
End: 2023-04-06
Payer: COMMERCIAL

## 2023-04-06 ENCOUNTER — HOSPITAL ENCOUNTER (OUTPATIENT)
Facility: HOSPITAL | Age: 43
Discharge: HOME | End: 2023-04-07
Attending: OBSTETRICS & GYNECOLOGY | Admitting: OBSTETRICS & GYNECOLOGY
Payer: COMMERCIAL

## 2023-04-06 DIAGNOSIS — R10.2 PELVIC PAIN: ICD-10-CM

## 2023-04-06 DIAGNOSIS — N83.8 MASS OF RIGHT OVARY: ICD-10-CM

## 2023-04-06 LAB
ABO GROUP BLD: NORMAL
BLD GP AB SCN SERPL QL: NEGATIVE
RH BLD: POSITIVE
T&S EXPIRATION DATE: NORMAL

## 2023-04-06 PROCEDURE — 88307 TISSUE EXAM BY PATHOLOGIST: CPT | Performed by: OBSTETRICS & GYNECOLOGY

## 2023-04-06 PROCEDURE — 86850 RBC ANTIBODY SCREEN: CPT | Performed by: OBSTETRICS & GYNECOLOGY

## 2023-04-06 PROCEDURE — 88331 PATH CONSLTJ SURG 1 BLK 1SPC: CPT | Performed by: OBSTETRICS & GYNECOLOGY

## 2023-04-06 PROCEDURE — 25010000002 MIDAZOLAM PER 1 MG: Performed by: ANESTHESIOLOGY

## 2023-04-06 PROCEDURE — 86901 BLOOD TYPING SEROLOGIC RH(D): CPT | Performed by: OBSTETRICS & GYNECOLOGY

## 2023-04-06 PROCEDURE — 25010000002 MAGNESIUM SULFATE PER 500 MG OF MAGNESIUM: Performed by: NURSE ANESTHETIST, CERTIFIED REGISTERED

## 2023-04-06 PROCEDURE — 58571 TLH W/T/O 250 G OR LESS: CPT | Performed by: OBSTETRICS & GYNECOLOGY

## 2023-04-06 PROCEDURE — 86900 BLOOD TYPING SEROLOGIC ABO: CPT | Performed by: OBSTETRICS & GYNECOLOGY

## 2023-04-06 PROCEDURE — 25010000002 HYDROMORPHONE 1 MG/ML SOLUTION: Performed by: NURSE ANESTHETIST, CERTIFIED REGISTERED

## 2023-04-06 PROCEDURE — 88332 PATH CONSLTJ SURG EA ADD BLK: CPT | Performed by: OBSTETRICS & GYNECOLOGY

## 2023-04-06 PROCEDURE — 25010000002 ONDANSETRON PER 1 MG: Performed by: NURSE ANESTHETIST, CERTIFIED REGISTERED

## 2023-04-06 PROCEDURE — 25010000002 ONDANSETRON PER 1 MG: Performed by: OBSTETRICS & GYNECOLOGY

## 2023-04-06 PROCEDURE — 25010000002 KETOROLAC TROMETHAMINE PER 15 MG: Performed by: OBSTETRICS & GYNECOLOGY

## 2023-04-06 PROCEDURE — 25010000002 FENTANYL CITRATE (PF) 50 MCG/ML SOLUTION: Performed by: NURSE ANESTHETIST, CERTIFIED REGISTERED

## 2023-04-06 PROCEDURE — 86923 COMPATIBILITY TEST ELECTRIC: CPT

## 2023-04-06 PROCEDURE — 58571 TLH W/T/O 250 G OR LESS: CPT | Performed by: PHYSICIAN ASSISTANT

## 2023-04-06 PROCEDURE — 25010000002 CEFAZOLIN IN DEXTROSE 2-4 GM/100ML-% SOLUTION: Performed by: OBSTETRICS & GYNECOLOGY

## 2023-04-06 PROCEDURE — 25010000002 DEXAMETHASONE SODIUM PHOSPHATE 20 MG/5ML SOLUTION: Performed by: NURSE ANESTHETIST, CERTIFIED REGISTERED

## 2023-04-06 PROCEDURE — 25010000002 PROPOFOL 10 MG/ML EMULSION: Performed by: NURSE ANESTHETIST, CERTIFIED REGISTERED

## 2023-04-06 RX ORDER — CEFAZOLIN SODIUM 2 G/100ML
2 INJECTION, SOLUTION INTRAVENOUS ONCE
Status: COMPLETED | OUTPATIENT
Start: 2023-04-06 | End: 2023-04-06

## 2023-04-06 RX ORDER — SODIUM CHLORIDE, SODIUM LACTATE, POTASSIUM CHLORIDE, CALCIUM CHLORIDE 600; 310; 30; 20 MG/100ML; MG/100ML; MG/100ML; MG/100ML
100 INJECTION, SOLUTION INTRAVENOUS CONTINUOUS
Status: DISCONTINUED | OUTPATIENT
Start: 2023-04-06 | End: 2023-04-06

## 2023-04-06 RX ORDER — ONDANSETRON 2 MG/ML
INJECTION INTRAMUSCULAR; INTRAVENOUS AS NEEDED
Status: DISCONTINUED | OUTPATIENT
Start: 2023-04-06 | End: 2023-04-06 | Stop reason: SURG

## 2023-04-06 RX ORDER — HYDROCODONE BITARTRATE AND ACETAMINOPHEN 5; 325 MG/1; MG/1
1 TABLET ORAL EVERY 4 HOURS PRN
Status: DISCONTINUED | OUTPATIENT
Start: 2023-04-06 | End: 2023-04-07 | Stop reason: HOSPADM

## 2023-04-06 RX ORDER — BISACODYL 10 MG
10 SUPPOSITORY, RECTAL RECTAL DAILY PRN
Status: DISCONTINUED | OUTPATIENT
Start: 2023-04-06 | End: 2023-04-07 | Stop reason: HOSPADM

## 2023-04-06 RX ORDER — DROPERIDOL 2.5 MG/ML
0.62 INJECTION, SOLUTION INTRAMUSCULAR; INTRAVENOUS
Status: DISCONTINUED | OUTPATIENT
Start: 2023-04-06 | End: 2023-04-06 | Stop reason: HOSPADM

## 2023-04-06 RX ORDER — NALOXONE HCL 0.4 MG/ML
0.2 VIAL (ML) INJECTION AS NEEDED
Status: DISCONTINUED | OUTPATIENT
Start: 2023-04-06 | End: 2023-04-06 | Stop reason: HOSPADM

## 2023-04-06 RX ORDER — NALOXONE HCL 0.4 MG/ML
0.4 VIAL (ML) INJECTION
Status: DISCONTINUED | OUTPATIENT
Start: 2023-04-06 | End: 2023-04-07 | Stop reason: HOSPADM

## 2023-04-06 RX ORDER — PROMETHAZINE HYDROCHLORIDE 12.5 MG/1
12.5 TABLET ORAL EVERY 6 HOURS PRN
Status: DISCONTINUED | OUTPATIENT
Start: 2023-04-06 | End: 2023-04-07 | Stop reason: HOSPADM

## 2023-04-06 RX ORDER — SODIUM CHLORIDE, SODIUM LACTATE, POTASSIUM CHLORIDE, CALCIUM CHLORIDE 600; 310; 30; 20 MG/100ML; MG/100ML; MG/100ML; MG/100ML
9 INJECTION, SOLUTION INTRAVENOUS CONTINUOUS
Status: DISCONTINUED | OUTPATIENT
Start: 2023-04-06 | End: 2023-04-06

## 2023-04-06 RX ORDER — FENTANYL CITRATE 50 UG/ML
50 INJECTION, SOLUTION INTRAMUSCULAR; INTRAVENOUS
Status: DISCONTINUED | OUTPATIENT
Start: 2023-04-06 | End: 2023-04-06 | Stop reason: HOSPADM

## 2023-04-06 RX ORDER — KETOROLAC TROMETHAMINE 30 MG/ML
30 INJECTION, SOLUTION INTRAMUSCULAR; INTRAVENOUS EVERY 8 HOURS
Status: DISCONTINUED | OUTPATIENT
Start: 2023-04-06 | End: 2023-04-07

## 2023-04-06 RX ORDER — FENTANYL CITRATE 50 UG/ML
INJECTION, SOLUTION INTRAMUSCULAR; INTRAVENOUS AS NEEDED
Status: DISCONTINUED | OUTPATIENT
Start: 2023-04-06 | End: 2023-04-06 | Stop reason: SURG

## 2023-04-06 RX ORDER — ONDANSETRON 2 MG/ML
4 INJECTION INTRAMUSCULAR; INTRAVENOUS EVERY 6 HOURS PRN
Status: DISCONTINUED | OUTPATIENT
Start: 2023-04-06 | End: 2023-04-07 | Stop reason: HOSPADM

## 2023-04-06 RX ORDER — PROMETHAZINE HYDROCHLORIDE 25 MG/1
25 SUPPOSITORY RECTAL ONCE AS NEEDED
Status: DISCONTINUED | OUTPATIENT
Start: 2023-04-06 | End: 2023-04-06 | Stop reason: HOSPADM

## 2023-04-06 RX ORDER — OXYCODONE AND ACETAMINOPHEN 7.5; 325 MG/1; MG/1
1 TABLET ORAL EVERY 4 HOURS PRN
Status: DISCONTINUED | OUTPATIENT
Start: 2023-04-06 | End: 2023-04-06 | Stop reason: HOSPADM

## 2023-04-06 RX ORDER — NALOXONE HCL 0.4 MG/ML
0.1 VIAL (ML) INJECTION
Status: DISCONTINUED | OUTPATIENT
Start: 2023-04-06 | End: 2023-04-07 | Stop reason: HOSPADM

## 2023-04-06 RX ORDER — SODIUM CHLORIDE 0.9 % (FLUSH) 0.9 %
3-10 SYRINGE (ML) INJECTION AS NEEDED
Status: DISCONTINUED | OUTPATIENT
Start: 2023-04-06 | End: 2023-04-06 | Stop reason: HOSPADM

## 2023-04-06 RX ORDER — BUPIVACAINE HYDROCHLORIDE AND EPINEPHRINE 5; 5 MG/ML; UG/ML
INJECTION, SOLUTION EPIDURAL; INTRACAUDAL; PERINEURAL AS NEEDED
Status: DISCONTINUED | OUTPATIENT
Start: 2023-04-06 | End: 2023-04-06 | Stop reason: HOSPADM

## 2023-04-06 RX ORDER — LIDOCAINE HYDROCHLORIDE 20 MG/ML
INJECTION, SOLUTION INFILTRATION; PERINEURAL AS NEEDED
Status: DISCONTINUED | OUTPATIENT
Start: 2023-04-06 | End: 2023-04-06 | Stop reason: SURG

## 2023-04-06 RX ORDER — ONDANSETRON 2 MG/ML
4 INJECTION INTRAMUSCULAR; INTRAVENOUS EVERY 6 HOURS PRN
Status: DISCONTINUED | OUTPATIENT
Start: 2023-04-06 | End: 2023-04-06

## 2023-04-06 RX ORDER — PROPOFOL 10 MG/ML
VIAL (ML) INTRAVENOUS AS NEEDED
Status: DISCONTINUED | OUTPATIENT
Start: 2023-04-06 | End: 2023-04-06 | Stop reason: SURG

## 2023-04-06 RX ORDER — ONDANSETRON 2 MG/ML
4 INJECTION INTRAMUSCULAR; INTRAVENOUS ONCE AS NEEDED
Status: COMPLETED | OUTPATIENT
Start: 2023-04-06 | End: 2023-04-06

## 2023-04-06 RX ORDER — HYDROMORPHONE HYDROCHLORIDE 1 MG/ML
0.5 INJECTION, SOLUTION INTRAMUSCULAR; INTRAVENOUS; SUBCUTANEOUS
Status: DISCONTINUED | OUTPATIENT
Start: 2023-04-06 | End: 2023-04-07 | Stop reason: HOSPADM

## 2023-04-06 RX ORDER — FAMOTIDINE 20 MG/1
20 TABLET, FILM COATED ORAL DAILY
Status: DISCONTINUED | OUTPATIENT
Start: 2023-04-06 | End: 2023-04-07 | Stop reason: HOSPADM

## 2023-04-06 RX ORDER — SODIUM CHLORIDE 0.9 % (FLUSH) 0.9 %
10 SYRINGE (ML) INJECTION EVERY 12 HOURS SCHEDULED
Status: DISCONTINUED | OUTPATIENT
Start: 2023-04-06 | End: 2023-04-06 | Stop reason: HOSPADM

## 2023-04-06 RX ORDER — FAMOTIDINE 10 MG/ML
20 INJECTION, SOLUTION INTRAVENOUS ONCE
Status: COMPLETED | OUTPATIENT
Start: 2023-04-06 | End: 2023-04-06

## 2023-04-06 RX ORDER — HYDROCODONE BITARTRATE AND ACETAMINOPHEN 10; 325 MG/1; MG/1
1 TABLET ORAL EVERY 4 HOURS PRN
Status: DISCONTINUED | OUTPATIENT
Start: 2023-04-06 | End: 2023-04-07 | Stop reason: HOSPADM

## 2023-04-06 RX ORDER — CALCIUM CARBONATE 200(500)MG
2 TABLET,CHEWABLE ORAL 3 TIMES DAILY PRN
Status: DISCONTINUED | OUTPATIENT
Start: 2023-04-06 | End: 2023-04-07 | Stop reason: HOSPADM

## 2023-04-06 RX ORDER — PROMETHAZINE HYDROCHLORIDE 12.5 MG/1
12.5 SUPPOSITORY RECTAL EVERY 6 HOURS PRN
Status: DISCONTINUED | OUTPATIENT
Start: 2023-04-06 | End: 2023-04-07 | Stop reason: HOSPADM

## 2023-04-06 RX ORDER — DEXAMETHASONE SODIUM PHOSPHATE 4 MG/ML
INJECTION, SOLUTION INTRA-ARTICULAR; INTRALESIONAL; INTRAMUSCULAR; INTRAVENOUS; SOFT TISSUE AS NEEDED
Status: DISCONTINUED | OUTPATIENT
Start: 2023-04-06 | End: 2023-04-06 | Stop reason: SURG

## 2023-04-06 RX ORDER — SODIUM CHLORIDE 9 MG/ML
100 INJECTION, SOLUTION INTRAVENOUS CONTINUOUS
Status: DISCONTINUED | OUTPATIENT
Start: 2023-04-06 | End: 2023-04-07

## 2023-04-06 RX ORDER — MAGNESIUM HYDROXIDE 1200 MG/15ML
LIQUID ORAL AS NEEDED
Status: DISCONTINUED | OUTPATIENT
Start: 2023-04-06 | End: 2023-04-06 | Stop reason: HOSPADM

## 2023-04-06 RX ORDER — SCOLOPAMINE TRANSDERMAL SYSTEM 1 MG/1
1 PATCH, EXTENDED RELEASE TRANSDERMAL ONCE
Status: DISCONTINUED | OUTPATIENT
Start: 2023-04-06 | End: 2023-04-06

## 2023-04-06 RX ORDER — ENOXAPARIN SODIUM 100 MG/ML
40 INJECTION SUBCUTANEOUS DAILY
Status: DISCONTINUED | OUTPATIENT
Start: 2023-04-07 | End: 2023-04-07

## 2023-04-06 RX ORDER — DOCUSATE SODIUM 100 MG/1
100 CAPSULE, LIQUID FILLED ORAL 2 TIMES DAILY PRN
Status: DISCONTINUED | OUTPATIENT
Start: 2023-04-06 | End: 2023-04-07 | Stop reason: HOSPADM

## 2023-04-06 RX ORDER — ONDANSETRON 4 MG/1
4 TABLET, FILM COATED ORAL EVERY 6 HOURS PRN
Status: DISCONTINUED | OUTPATIENT
Start: 2023-04-06 | End: 2023-04-07 | Stop reason: HOSPADM

## 2023-04-06 RX ORDER — DIPHENHYDRAMINE HYDROCHLORIDE 50 MG/ML
25 INJECTION INTRAMUSCULAR; INTRAVENOUS NIGHTLY PRN
Status: DISCONTINUED | OUTPATIENT
Start: 2023-04-06 | End: 2023-04-07 | Stop reason: HOSPADM

## 2023-04-06 RX ORDER — DIPHENHYDRAMINE HCL 25 MG
25 CAPSULE ORAL NIGHTLY PRN
Status: DISCONTINUED | OUTPATIENT
Start: 2023-04-06 | End: 2023-04-07 | Stop reason: HOSPADM

## 2023-04-06 RX ORDER — PROMETHAZINE HYDROCHLORIDE 25 MG/1
25 TABLET ORAL ONCE AS NEEDED
Status: DISCONTINUED | OUTPATIENT
Start: 2023-04-06 | End: 2023-04-06 | Stop reason: HOSPADM

## 2023-04-06 RX ORDER — WOUND DRESSING ADHESIVE - LIQUID
LIQUID MISCELLANEOUS AS NEEDED
Status: DISCONTINUED | OUTPATIENT
Start: 2023-04-06 | End: 2023-04-06 | Stop reason: HOSPADM

## 2023-04-06 RX ORDER — FLUMAZENIL 0.1 MG/ML
0.2 INJECTION INTRAVENOUS AS NEEDED
Status: DISCONTINUED | OUTPATIENT
Start: 2023-04-06 | End: 2023-04-06 | Stop reason: HOSPADM

## 2023-04-06 RX ORDER — ROCURONIUM BROMIDE 10 MG/ML
INJECTION, SOLUTION INTRAVENOUS AS NEEDED
Status: DISCONTINUED | OUTPATIENT
Start: 2023-04-06 | End: 2023-04-06 | Stop reason: SURG

## 2023-04-06 RX ORDER — HYDROMORPHONE HYDROCHLORIDE 1 MG/ML
0.5 INJECTION, SOLUTION INTRAMUSCULAR; INTRAVENOUS; SUBCUTANEOUS
Status: DISCONTINUED | OUTPATIENT
Start: 2023-04-06 | End: 2023-04-06 | Stop reason: HOSPADM

## 2023-04-06 RX ORDER — MIDAZOLAM HYDROCHLORIDE 1 MG/ML
1 INJECTION INTRAMUSCULAR; INTRAVENOUS
Status: DISCONTINUED | OUTPATIENT
Start: 2023-04-06 | End: 2023-04-06 | Stop reason: HOSPADM

## 2023-04-06 RX ORDER — HYDRALAZINE HYDROCHLORIDE 20 MG/ML
5 INJECTION INTRAMUSCULAR; INTRAVENOUS
Status: DISCONTINUED | OUTPATIENT
Start: 2023-04-06 | End: 2023-04-06 | Stop reason: HOSPADM

## 2023-04-06 RX ORDER — DIPHENHYDRAMINE HYDROCHLORIDE 50 MG/ML
12.5 INJECTION INTRAMUSCULAR; INTRAVENOUS
Status: DISCONTINUED | OUTPATIENT
Start: 2023-04-06 | End: 2023-04-06 | Stop reason: HOSPADM

## 2023-04-06 RX ORDER — SODIUM CHLORIDE 9 MG/ML
INJECTION, SOLUTION INTRAVENOUS AS NEEDED
Status: DISCONTINUED | OUTPATIENT
Start: 2023-04-06 | End: 2023-04-06 | Stop reason: HOSPADM

## 2023-04-06 RX ORDER — EPHEDRINE SULFATE 50 MG/ML
5 INJECTION, SOLUTION INTRAVENOUS ONCE AS NEEDED
Status: DISCONTINUED | OUTPATIENT
Start: 2023-04-06 | End: 2023-04-06 | Stop reason: HOSPADM

## 2023-04-06 RX ORDER — HYDROCODONE BITARTRATE AND ACETAMINOPHEN 7.5; 325 MG/1; MG/1
1 TABLET ORAL ONCE AS NEEDED
Status: DISCONTINUED | OUTPATIENT
Start: 2023-04-06 | End: 2023-04-06 | Stop reason: HOSPADM

## 2023-04-06 RX ORDER — MAGNESIUM SULFATE HEPTAHYDRATE 500 MG/ML
INJECTION, SOLUTION INTRAMUSCULAR; INTRAVENOUS AS NEEDED
Status: DISCONTINUED | OUTPATIENT
Start: 2023-04-06 | End: 2023-04-06 | Stop reason: SURG

## 2023-04-06 RX ORDER — SIMETHICONE 80 MG
120 TABLET,CHEWABLE ORAL
Status: DISCONTINUED | OUTPATIENT
Start: 2023-04-06 | End: 2023-04-07 | Stop reason: HOSPADM

## 2023-04-06 RX ORDER — SODIUM CHLORIDE 0.9 % (FLUSH) 0.9 %
3 SYRINGE (ML) INJECTION EVERY 12 HOURS SCHEDULED
Status: DISCONTINUED | OUTPATIENT
Start: 2023-04-06 | End: 2023-04-06 | Stop reason: HOSPADM

## 2023-04-06 RX ORDER — HYDROCODONE BITARTRATE AND ACETAMINOPHEN 5; 325 MG/1; MG/1
1 TABLET ORAL EVERY 6 HOURS PRN
Qty: 20 TABLET | Refills: 0 | Status: SHIPPED | OUTPATIENT
Start: 2023-04-06 | End: 2023-04-10 | Stop reason: SDUPTHER

## 2023-04-06 RX ORDER — LABETALOL HYDROCHLORIDE 5 MG/ML
5 INJECTION, SOLUTION INTRAVENOUS
Status: DISCONTINUED | OUTPATIENT
Start: 2023-04-06 | End: 2023-04-06 | Stop reason: HOSPADM

## 2023-04-06 RX ORDER — IPRATROPIUM BROMIDE AND ALBUTEROL SULFATE 2.5; .5 MG/3ML; MG/3ML
3 SOLUTION RESPIRATORY (INHALATION) ONCE AS NEEDED
Status: DISCONTINUED | OUTPATIENT
Start: 2023-04-06 | End: 2023-04-06 | Stop reason: HOSPADM

## 2023-04-06 RX ORDER — LIDOCAINE HYDROCHLORIDE 10 MG/ML
0.5 INJECTION, SOLUTION EPIDURAL; INFILTRATION; INTRACAUDAL; PERINEURAL ONCE AS NEEDED
Status: DISCONTINUED | OUTPATIENT
Start: 2023-04-06 | End: 2023-04-06 | Stop reason: HOSPADM

## 2023-04-06 RX ADMIN — FENTANYL CITRATE 50 MCG: 50 INJECTION, SOLUTION INTRAMUSCULAR; INTRAVENOUS at 12:51

## 2023-04-06 RX ADMIN — SODIUM CHLORIDE, POTASSIUM CHLORIDE, SODIUM LACTATE AND CALCIUM CHLORIDE 9 ML/HR: 600; 310; 30; 20 INJECTION, SOLUTION INTRAVENOUS at 07:43

## 2023-04-06 RX ADMIN — SODIUM CHLORIDE 100 ML/HR: 9 INJECTION, SOLUTION INTRAVENOUS at 16:56

## 2023-04-06 RX ADMIN — ONDANSETRON 4 MG: 2 INJECTION INTRAMUSCULAR; INTRAVENOUS at 17:50

## 2023-04-06 RX ADMIN — ONDANSETRON 4 MG: 2 INJECTION INTRAMUSCULAR; INTRAVENOUS at 12:52

## 2023-04-06 RX ADMIN — SCOPALAMINE 1 PATCH: 1 PATCH, EXTENDED RELEASE TRANSDERMAL at 07:42

## 2023-04-06 RX ADMIN — ROCURONIUM BROMIDE 20 MG: 10 INJECTION, SOLUTION INTRAVENOUS at 10:01

## 2023-04-06 RX ADMIN — SIMETHICONE 120 MG: 80 TABLET, CHEWABLE ORAL at 16:55

## 2023-04-06 RX ADMIN — SODIUM CHLORIDE, POTASSIUM CHLORIDE, SODIUM LACTATE AND CALCIUM CHLORIDE: 600; 310; 30; 20 INJECTION, SOLUTION INTRAVENOUS at 07:58

## 2023-04-06 RX ADMIN — MAGNESIUM SULFATE HEPTAHYDRATE 2 G: 500 INJECTION, SOLUTION INTRAMUSCULAR; INTRAVENOUS at 08:50

## 2023-04-06 RX ADMIN — FENTANYL CITRATE 50 MCG: 50 INJECTION, SOLUTION INTRAMUSCULAR; INTRAVENOUS at 08:02

## 2023-04-06 RX ADMIN — KETOROLAC TROMETHAMINE 30 MG: 30 INJECTION, SOLUTION INTRAMUSCULAR at 21:36

## 2023-04-06 RX ADMIN — SIMETHICONE 120 MG: 80 TABLET, CHEWABLE ORAL at 21:36

## 2023-04-06 RX ADMIN — HYDROMORPHONE HYDROCHLORIDE 0.5 MG: 1 INJECTION, SOLUTION INTRAMUSCULAR; INTRAVENOUS; SUBCUTANEOUS at 08:58

## 2023-04-06 RX ADMIN — HYDROCODONE BITARTRATE AND ACETAMINOPHEN 1 TABLET: 5; 325 TABLET ORAL at 16:55

## 2023-04-06 RX ADMIN — SUGAMMADEX 150 MG: 100 INJECTION, SOLUTION INTRAVENOUS at 11:28

## 2023-04-06 RX ADMIN — CEFAZOLIN SODIUM 2 G: 2 INJECTION, SOLUTION INTRAVENOUS at 07:54

## 2023-04-06 RX ADMIN — ONDANSETRON 4 MG: 2 INJECTION INTRAMUSCULAR; INTRAVENOUS at 11:27

## 2023-04-06 RX ADMIN — HYDROMORPHONE HYDROCHLORIDE 0.5 MG: 1 INJECTION, SOLUTION INTRAMUSCULAR; INTRAVENOUS; SUBCUTANEOUS at 11:32

## 2023-04-06 RX ADMIN — ROCURONIUM BROMIDE 50 MG: 10 INJECTION, SOLUTION INTRAVENOUS at 08:06

## 2023-04-06 RX ADMIN — PROPOFOL 200 MG: 10 INJECTION, EMULSION INTRAVENOUS at 08:06

## 2023-04-06 RX ADMIN — LIDOCAINE HYDROCHLORIDE 100 MG: 20 INJECTION, SOLUTION INFILTRATION; PERINEURAL at 08:06

## 2023-04-06 RX ADMIN — PROPOFOL 25 MCG/KG/MIN: 10 INJECTION, EMULSION INTRAVENOUS at 08:16

## 2023-04-06 RX ADMIN — MIDAZOLAM 1 MG: 1 INJECTION INTRAMUSCULAR; INTRAVENOUS at 07:42

## 2023-04-06 RX ADMIN — FAMOTIDINE 20 MG: 10 INJECTION INTRAVENOUS at 07:42

## 2023-04-06 RX ADMIN — DEXAMETHASONE SODIUM PHOSPHATE 8 MG: 4 INJECTION, SOLUTION INTRAMUSCULAR; INTRAVENOUS at 08:17

## 2023-04-06 RX ADMIN — ONDANSETRON 4 MG: 2 INJECTION INTRAMUSCULAR; INTRAVENOUS at 13:24

## 2023-04-06 NOTE — OP NOTE
Gynecologic Oncology - Operative Report         PATIENT NAME: Niecy Hooks  YOB: 1980   AGE: 42 y.o.  MRN#: [id number]  [facility]#: [id number]      DATE OF OPERATION: 4/6/2023    PREOPERATIVE DIAGNOSIS:   1. Adnexal mass   2. Dysmenorrhea   3. Endometriosis     POSTOPERATIVE DIAGNOSIS:  1. Same     OPERATION PERFORMED:    1. Robotic assisted total laparoscopic hysterectomy   2. Bilateral salpingectomy   3. Right oophorectomy   4. Lysis of adhesions 2hrs  5. Bilateral ureterolysis     SURGEON: Jerri Wodoward D.O     ASSISTANT: Kimberly FORTUNE - who assisted with all aspects of the case including positioning, set up, retraction, suction, suturing and closure.     ANESTHESIA: GEN    ESTIMATED BLOOD LOSS: 100mL   IVF: 1600mL LR   UO: 1000mL   LINES/DRAINS: n/a    INDICATIONS: large adnexal mass, enlarged fibroid uterus, pelvic pain     FINDINGS: severe endometriosis ASRM IV      PROCEDURE:   After assuring informed consent the patient was taken back to the operating suite and induction of general anesthesia was performed.  The patient was placed in the modified dorsal lithotomy position in Josafat stirrups then prepped and draped in the normal sterile fashion.  The open sided bi-valve speculum was placed in the patient's vagina and the cervix was grasped at the 12 O'clock position with a single tooth tenaculum.  The uterus was sounded to 14 cm and the cervix was dilated with the stacy dilators up to a number 15.  A V-Care uterine manipulator was placed inside the uterine cavity.  Marley catheter was placed.  Gloves were changed and attention was then taken to the abdomen.    A supraumbilical skin incision was then made approximately 26cm above the pubic symphysis and a veress needle was introduced into this incision.  Proper placement was confirmed by sterile water flowing easily into this incision and a low opening CO2 pressure.  High flow insufflation was utilized to create pneumoperitoneum to a maximum  pressure of 15 mmHg.  An 8mm trocar was then inserted through this incision and the Robotic camera verified proper placement and safe entry for camera port/robotic arm #3.  Three additional 8mm ports were placed.  Robotic arm #4 port was placed 10cm right lateral and 15 degrees inferiorly.  Robotic arm #2 port was placed 10 cm left lateral and 15 degrees inferiorly. Arm 1 was placed 10cm left lateral and 15 degrees superior to arm #2.  A 10/12 accessory port was placed beneath the right costal margin.  The patient was then placed in steep Trendelenberg position.  Pelvic washings were obtained.  Blunt grasper was used to move bowel and omentum into the upper abdomen for maximum visualization. The robot was docked.  Monopolar scissors were placed in arm #4; bipolar fenestrated graspers in arm #2 and the double fenestrated graspers were placed in arm #1.      There were quite extensive adhesions from endometriosis chronic smoldering inflammation. Colon was densely adherent to the posterior uterus, cul de sac was obliterated, right ovary was a very large endometrioma, adherent to sidewall, cul de sac, ureter. It took approximtely 2 hours to carefully dissect the colon off, identify the right IP ligament, ureterolysis bilateral to ensure ureter was out of harms way, and establish as normal of anatomy as possible. The appendix had been subject to chronic inflammation but did not appear diseased in and of itself.     Once normal anatomy was restored, bilateral round ligaments were taken down with monopolar cautery and the retroperitoneal space was opened.  The underlying ureters were visualized. A window was created in the posterior leaf of the broad ligament.  The infundibulopelvic ligament on the right cauterized with bipolar cautery and cut with monopolar cautery.  The left fallopian tube was dissected off the ovarian complex and the uteroovarian ligament was then cauterized and transected. The vesicouterine peritoneum  was incised and the bladder was dissected off the lower uterine segment and cervix.  The uterine vessels were then cauterized bilaterally with the bipolar and transected with the monopolar cautery.  The cardinal and uterosacral ligaments were then cauterized with the bipolar and transected with the monopolar cautery.  A posterior colpotomy was made at the level of the Vcare and brought around circumferentially freeing up the specimen, which was brought out through the vagina.    The vaginal cuff was then reapproximated with 0 PDS suture in a running fashion, starting from either end and tying in the middle.  The pelvis was then irrigated and the vaginal cuff was noted to be hemostatic.  The right infundibulopelvic ligament was noted to be hemostatic, the left ovary was hemostatic as well.  The instruments were then removed and the robot was undocked.  The pneumoperitoneum was then evacuated and all trocars were removed.  The 10-12 mm trocar site fascia was reapproximated with a figure of eight stitch of 0 vicryl.  All skin incisions were closed with a 4-0 vicryl subcuticular stitch.  Incisions were reinjected with additional local anesthetic. Steri-strips were placed as well as bandages.  The vagina was examined with sponge sticks x 2 and noted to be hemostatic. Marley was removed. Sponge, lap, needle and instrument counts were correct x 2.  The patient was taken to the recovery room in awake and stable condition.      Jerri Woodward D.O  4/6/2023    Gynecologic Oncology   [address] [place] [address]  [phone] office

## 2023-04-06 NOTE — PLAN OF CARE
Goal Outcome Evaluation:  Plan of Care Reviewed With: patient        Progress: improving  Outcome Evaluation: pt arrived from PACU and is still drowsy, c/o pain but states it is managable and denies need for medication, amb to BR and voided since arrival to unit, lap x5-umbilicus and site to the R of it has serosang drainage, those 2 sites were reinforced w gauze and paper tape, scant vag bleeding, tolerating small amounts of PO intake, questions encouraged and answered, VSS, SCDs on, IVF infusing
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
## 2023-04-06 NOTE — ANESTHESIA PROCEDURE NOTES
Airway  Urgency: elective    Date/Time: 4/6/2023 8:10 AM  Airway not difficult    General Information and Staff    Patient location during procedure: OR  Anesthesiologist: Cash Bonilla MD  CRNA/CAA: Sabrina Godinez CRNA    Indications and Patient Condition  Indications for airway management: airway protection    Preoxygenated: yes  MILS not maintained throughout  Mask difficulty assessment: 1 - vent by mask    Final Airway Details  Final airway type: endotracheal airway      Successful airway: ETT  Cuffed: yes   Successful intubation technique: direct laryngoscopy  Facilitating devices/methods: anterior pressure/BURP  Endotracheal tube insertion site: oral  Blade: Michelle  Blade size: 3  ETT size (mm): 7.0  Cormack-Lehane Classification: grade IIb - view of arytenoids or posterior of glottis only  Placement verified by: chest auscultation and capnometry   Cuff volume (mL): 7  Measured from: lips  ETT/EBT  to lips (cm): 22  Number of attempts at approach: 1  Assessment: lips, teeth, and gum same as pre-op and atraumatic intubation    Additional Comments  Pt preoxygenated prior to induction, easy mask airway, atraumatic intubation,+ ETCO2, + bs bilat,  ETT secured and connected to ventilator.

## 2023-04-06 NOTE — ANESTHESIA PREPROCEDURE EVALUATION
Anesthesia Evaluation     Patient summary reviewed and Nursing notes reviewed                Airway   Mallampati: II  TM distance: >3 FB  Neck ROM: full  No difficulty expected  Dental      Pulmonary - negative pulmonary ROS   Cardiovascular - negative cardio ROS    ECG reviewed  Rhythm: regular  Rate: normal        Neuro/Psych  (+) numbness, psychiatric history Depression,    GI/Hepatic/Renal/Endo    (+)   thyroid problem hypothyroidism    Musculoskeletal (-) negative ROS    Abdominal    Substance History - negative use     OB/GYN negative ob/gyn ROS         Other                        Anesthesia Plan    ASA 2     general     (I have reviewed the patient's history with the patient and the chart, including all pertinent laboratory results and imaging. I have explained the risks of anesthesia including but not limited to dental damage, corneal abrasion, nerve injury, MI, stroke, and death. Questions asked and answered. Anesthetic plan discussed with patient and team as indicated. Patient expressed understanding of the above.  )  intravenous induction     Anesthetic plan, risks, benefits, and alternatives have been provided, discussed and informed consent has been obtained with: patient and spouse/significant other.        CODE STATUS:

## 2023-04-07 VITALS
TEMPERATURE: 98.2 F | HEART RATE: 78 BPM | RESPIRATION RATE: 18 BRPM | OXYGEN SATURATION: 97 % | DIASTOLIC BLOOD PRESSURE: 64 MMHG | SYSTOLIC BLOOD PRESSURE: 106 MMHG

## 2023-04-07 LAB
ANION GAP SERPL CALCULATED.3IONS-SCNC: 7.6 MMOL/L (ref 5–15)
BASOPHILS # BLD AUTO: 0.02 10*3/MM3 (ref 0–0.2)
BASOPHILS NFR BLD AUTO: 0.2 % (ref 0–1.5)
BUN SERPL-MCNC: 6 MG/DL (ref 6–20)
BUN/CREAT SERPL: 11.8 (ref 7–25)
CALCIUM SPEC-SCNC: 8.1 MG/DL (ref 8.6–10.5)
CHLORIDE SERPL-SCNC: 107 MMOL/L (ref 98–107)
CO2 SERPL-SCNC: 24.4 MMOL/L (ref 22–29)
CREAT SERPL-MCNC: 0.51 MG/DL (ref 0.57–1)
DEPRECATED RDW RBC AUTO: 53.1 FL (ref 37–54)
EGFRCR SERPLBLD CKD-EPI 2021: 118.2 ML/MIN/1.73
EOSINOPHIL # BLD AUTO: 0.02 10*3/MM3 (ref 0–0.4)
EOSINOPHIL NFR BLD AUTO: 0.2 % (ref 0.3–6.2)
ERYTHROCYTE [DISTWIDTH] IN BLOOD BY AUTOMATED COUNT: 17.2 % (ref 12.3–15.4)
GLUCOSE SERPL-MCNC: 113 MG/DL (ref 65–99)
HCT VFR BLD AUTO: 25.3 % (ref 34–46.6)
HCT VFR BLD AUTO: 27.9 % (ref 34–46.6)
HGB BLD-MCNC: 7.7 G/DL (ref 12–15.9)
HGB BLD-MCNC: 8.8 G/DL (ref 12–15.9)
IMM GRANULOCYTES # BLD AUTO: 0.03 10*3/MM3 (ref 0–0.05)
IMM GRANULOCYTES NFR BLD AUTO: 0.3 % (ref 0–0.5)
LAB AP CASE REPORT: NORMAL
LYMPHOCYTES # BLD AUTO: 1.09 10*3/MM3 (ref 0.7–3.1)
LYMPHOCYTES NFR BLD AUTO: 11.3 % (ref 19.6–45.3)
Lab: NORMAL
MCH RBC QN AUTO: 25.8 PG (ref 26.6–33)
MCHC RBC AUTO-ENTMCNC: 30.4 G/DL (ref 31.5–35.7)
MCV RBC AUTO: 84.9 FL (ref 79–97)
MONOCYTES # BLD AUTO: 0.76 10*3/MM3 (ref 0.1–0.9)
MONOCYTES NFR BLD AUTO: 7.9 % (ref 5–12)
NEUTROPHILS NFR BLD AUTO: 7.7 10*3/MM3 (ref 1.7–7)
NEUTROPHILS NFR BLD AUTO: 80.1 % (ref 42.7–76)
NRBC BLD AUTO-RTO: 0 /100 WBC (ref 0–0.2)
PATH REPORT.FINAL DX SPEC: NORMAL
PATH REPORT.GROSS SPEC: NORMAL
PLATELET # BLD AUTO: 222 10*3/MM3 (ref 140–450)
PMV BLD AUTO: 10.5 FL (ref 6–12)
POTASSIUM SERPL-SCNC: 3.3 MMOL/L (ref 3.5–5.2)
RBC # BLD AUTO: 2.98 10*6/MM3 (ref 3.77–5.28)
SODIUM SERPL-SCNC: 139 MMOL/L (ref 136–145)
WBC NRBC COR # BLD: 9.62 10*3/MM3 (ref 3.4–10.8)

## 2023-04-07 PROCEDURE — 86901 BLOOD TYPING SEROLOGIC RH(D): CPT

## 2023-04-07 PROCEDURE — 80048 BASIC METABOLIC PNL TOTAL CA: CPT | Performed by: OBSTETRICS & GYNECOLOGY

## 2023-04-07 PROCEDURE — 36430 TRANSFUSION BLD/BLD COMPNT: CPT

## 2023-04-07 PROCEDURE — 86900 BLOOD TYPING SEROLOGIC ABO: CPT

## 2023-04-07 PROCEDURE — 25010000002 KETOROLAC TROMETHAMINE PER 15 MG: Performed by: OBSTETRICS & GYNECOLOGY

## 2023-04-07 PROCEDURE — 85018 HEMOGLOBIN: CPT | Performed by: OBSTETRICS & GYNECOLOGY

## 2023-04-07 PROCEDURE — 85014 HEMATOCRIT: CPT | Performed by: OBSTETRICS & GYNECOLOGY

## 2023-04-07 PROCEDURE — 85025 COMPLETE CBC W/AUTO DIFF WBC: CPT | Performed by: OBSTETRICS & GYNECOLOGY

## 2023-04-07 PROCEDURE — 99024 POSTOP FOLLOW-UP VISIT: CPT | Performed by: PHYSICIAN ASSISTANT

## 2023-04-07 PROCEDURE — P9016 RBC LEUKOCYTES REDUCED: HCPCS

## 2023-04-07 RX ADMIN — SIMETHICONE 120 MG: 80 TABLET, CHEWABLE ORAL at 06:40

## 2023-04-07 RX ADMIN — KETOROLAC TROMETHAMINE 30 MG: 30 INJECTION, SOLUTION INTRAMUSCULAR at 06:02

## 2023-04-07 RX ADMIN — HYDROCODONE BITARTRATE AND ACETAMINOPHEN 1 TABLET: 5; 325 TABLET ORAL at 06:06

## 2023-04-07 RX ADMIN — HYDROCODONE BITARTRATE AND ACETAMINOPHEN 1 TABLET: 10; 325 TABLET ORAL at 13:35

## 2023-04-07 RX ADMIN — SIMETHICONE 120 MG: 80 TABLET, CHEWABLE ORAL at 13:36

## 2023-04-07 RX ADMIN — DOCUSATE SODIUM 100 MG: 100 CAPSULE, LIQUID FILLED ORAL at 06:06

## 2023-04-07 RX ADMIN — FAMOTIDINE 20 MG: 20 TABLET, FILM COATED ORAL at 09:41

## 2023-04-07 RX ADMIN — DOCUSATE SODIUM 100 MG: 100 CAPSULE, LIQUID FILLED ORAL at 19:06

## 2023-04-07 RX ADMIN — HYDROCODONE BITARTRATE AND ACETAMINOPHEN 1 TABLET: 10; 325 TABLET ORAL at 19:06

## 2023-04-07 NOTE — PLAN OF CARE
Goal Outcome Evaluation:  Plan of Care Reviewed With: patient        Progress: improving  Outcome Evaluation: ivf changed to sl, vdg, abd lap sites x5-2 with dried drainage, walked. i/s, scd, med prn and also with scheduled toradol, prob d/c friday

## 2023-04-07 NOTE — DISCHARGE SUMMARY
DISCHARGE SUMMARY       PATIENT INFO:  NAME: Niecy Hooks  : 1980  MRN#: [id number]      ADMIT DATE: 2023  5:50 AM   DISCHARGE DATE: 23      ADMITTING DIAGNOSIS:   • Mass of right ovary [N83.8]  • Complex cyst of right ovary [N83.291]       DISCHARGE DIAGNOSIS:   Endometrioma, endometriosis      Patient Active Problem List   Diagnosis   • Plantar fasciitis   • Major depressive disorder, recurrent episode, mild degree   • Anemia   • IFG (impaired fasting glucose)   • Menometrorrhagia   • Dysmenorrhea   • Pelvic pain and dysmenorrhea complex right ovarian cyst   • Leiomyoma of uterus, unspecified   • Thickened endometrium   • Cyst of right ovary   • Pelvic congestive syndrome   • Pelvic pain   • Complex cyst of right ovary   •         HOSPITAL CONSULTANTS:   •  None      Surgical Procedures Since Admission:  Procedure(s):  robotic assisted right oophorectomy,  hysterectomy, bilateral salpingectomy,and ureterolysis   Surgeon:  Jerri Woodward, DO  Status:  1 Day Post-Op  -------------------  •        OUTPATIENT CARE TEAM:   • Patient Care Team:  • Rosaline Robertson APRN as PCP - General (Internal Medicine & Pediatrics)    HOSPITAL COURSE:   Pt presented to hospital yesterday for scheduled RATLH, bilateral salpingectomy, right oophorectomy, SUNI, bilateral ureterolysis secondary to endometrioma/endometriosis.  She tolerated the procedure without issue.  Pain well controlled this AM.  Tolerating a diet.  Voiding on own.  Hgb 7.7- received 1U PRBC prior to d/c.  Incisions healing appropriately.  VSS.        PHYSICAL EXAM:    Vitals:    23 1304   BP: 103/67   Pulse: 76   Resp: 16   Temp: 98.1 °F (36.7 °C)   SpO2: 99%       General: AOx3, no acute distress  Heart: RRR  Lungs: unlabored respirations, breathing on RA  Abdominal: soft, appropriately tender to palpation, incisions bandaged appropriately          DC MEDICATIONS:   Current Discharge Medication List      CONTINUE these  medications which have NOT CHANGED    Details   Iron, Ferrous Sulfate, 325 (65 Fe) MG tablet Take 325 mg by mouth Daily.  Qty: 90 tablet, Refills: 1    Associated Diagnoses: Iron deficiency anemia, unspecified iron deficiency anemia type            Current Discharge Medication List             DISCHARGE INSTRUCTIONS:  Okay for discharge home.  Discharge instructions reviewed with pt.  All questions and concerns answered.  Pathology pending at time of discharge, final pathology will be reviewed with patient at post-operative office visit.     • Maintain proper balance of hydration, nutrition, mobility and rest  • Continue to use your incentive spirometer for the rest of the week    • Do not lift anything heavier than 10 lbs. for the next two weeks  • Do not perform strenuous activity  • Continue PELVC REST for 6 weeks, which means no tampons, intercourse, douching or submerging in sitting water (baths, jacuzzis or swimming).  • Keep your wound clean and dry    • Notify the office if you experience:   - Fever > 100.5F  - Foul wound drainage, redness or large separation  - Severe nausea and vomiting  - Severe increase in pain   - Severe swelling in either calf     • Driving is OK if you are not taking narcotics  • Stairs are OK, just take it slow                Kimberly Joshua PA-C  4/7/2023    Gynecologic Oncology   [address] [place], [place] [id number] [phone] office

## 2023-04-07 NOTE — PROGRESS NOTES
SUBJECTIVE:   Recovering well.  Pain well controlled.  Tolerating diet without N/V.  Voiding on own.  Ambulating.    OBJECTIVE:     Vitals:    04/07/23 0906   BP: 110/73   Pulse: 75   Resp: 16   Temp: 97.4 °F (36.3 °C)   SpO2: 98%     GEN: alert and oriented, no acute distress, sitting up in bed eating breakfast  ABD: soft, appropriate incisional tenderness, non distended       LABS REVIEWED  hgb 7.7  VSS      ASSESSMENT:    • POD #1 s/p RATLH, bilateral salpingectomy, right oophorectomy, SUNI, bilateral ureterolysis - 4/6/23        PLAN:   FEN: diet as tolerated  NEURO/PAIN: prn Norco  CARDIO: stable  PULM: IS use   GI: diet as tolerated  : voiding on own  HEME: hgb 7.7  ENDO:  Glucose 113  ID: antibiotics at time of operation  PROPH:  SCDs, pepcid   DISPO:   - 1U PRBC ordered, repeat H+H after  - will re-evaluate after transfusion if pt stable for discharge today vs tomorrow          Kimberly Joshua PA-C  4/7/2023    Gynecologic Oncology   [address] [place] [address] [address] [place] [address]  [phone] office

## 2023-04-08 LAB
BH BB BLOOD EXPIRATION DATE: NORMAL
BH BB BLOOD TYPE BARCODE: 5100
BH BB DISPENSE STATUS: NORMAL
BH BB PRODUCT CODE: NORMAL
BH BB UNIT NUMBER: NORMAL
CROSSMATCH INTERPRETATION: NORMAL
UNIT  ABO: NORMAL
UNIT  RH: NORMAL

## 2023-04-10 ENCOUNTER — TELEPHONE (OUTPATIENT)
Dept: GYNECOLOGIC ONCOLOGY | Facility: CLINIC | Age: 43
End: 2023-04-10
Payer: COMMERCIAL

## 2023-04-10 DIAGNOSIS — R10.2 PELVIC PAIN: ICD-10-CM

## 2023-04-10 NOTE — TELEPHONE ENCOUNTER
Caller: Niecy Hooks    Relationship: Self    Best call back number: [phone]    Requested Prescriptions:   Requested Prescriptions     Pending Prescriptions Disp Refills   • HYDROcodone-acetaminophen (Norco) 5-325 MG per tablet 20 tablet 0     Sig: Take 1 tablet by mouth Every 6 (Six) Hours As Needed for Moderate Pain.        Pharmacy where request should be sent: [id number] - [place], [place] - [address] [facility] [address] [phone] [facility] [phone] FX     Last office visit with prescribing clinician: 3/13/2023   Last telemedicine visit with prescribing clinician: 4/19/2023   Next office visit with prescribing clinician: 4/19/2023     Additional details provided by patient: PT STATED THAT SHE WAS NOT ABLE TO  THE HYDROCODONE AFTER HER PROCEDURE BECAUSE SHE LEFT HER WALLET AT HOME. PLEASE SEND TO [facility] LISTED ABOVE.     Does the patient have less than a 3 day supply:  [x] Yes  [] No    Would you like a call back once the refill request has been completed: [] Yes [x] No    If the office needs to give you a call back, can they leave a voicemail: [] Yes [x] No

## 2023-04-10 NOTE — PROGRESS NOTES
Case Management Discharge Note      Final Note: Discharged home. Makenzie Chacon, SHARRI              Transportation Services  Private: Car    Final Discharge Disposition Code: 01 - home or self-care

## 2023-04-10 NOTE — TELEPHONE ENCOUNTER
Caller: Haleigh Hooksena    Relationship: Self    Best call back number: [phone]    What is the best time to reach you: ANYTIME    Who are you requesting to speak with (clinical staff, provider, specific staff member): CLINICAL TEAM    What was the call regarding: PT STATED SHE NEEDS A WORK NOTE STATING SHE WILL BE OFF WORK 6 WEEKS. SHE STATED SHE ALREADY TURNED IN THE [facility] PAPERWORK AND FAX NUMBER TO OUR OFFICE - PLEASE SEND TO THE FAX NUMBER LISTED.     Do you require a callback: YES

## 2023-04-11 ENCOUNTER — PATIENT ROUNDING (BHMG ONLY) (OUTPATIENT)
Dept: GYNECOLOGIC ONCOLOGY | Facility: CLINIC | Age: 43
End: 2023-04-11
Payer: COMMERCIAL

## 2023-04-11 RX ORDER — HYDROCODONE BITARTRATE AND ACETAMINOPHEN 5; 325 MG/1; MG/1
1 TABLET ORAL EVERY 6 HOURS PRN
Qty: 20 TABLET | Refills: 0 | Status: SHIPPED | OUTPATIENT
Start: 2023-04-11 | End: 2023-04-17 | Stop reason: SDUPTHER

## 2023-04-11 NOTE — TELEPHONE ENCOUNTER
Spoke with patient to let her know that all [place] paperwork and note had been sent to her employer. Patient asked about pain medication and stated that prescription had been sent to [facility] pharmacy, but she had forgotten her wallet and could not pick it up. Patient asked if it could be sent to another pharmacy. Advised patient that I would send a message to provider to see if it could be changed. Patient verbally acknowledged.

## 2023-04-11 NOTE — PROGRESS NOTES
Edyt message sent to patient for PATIENT ROUNDING with [facility]

## 2023-04-17 DIAGNOSIS — R10.2 PELVIC PAIN: ICD-10-CM

## 2023-04-17 NOTE — TELEPHONE ENCOUNTER
Caller: Niecy Hooks    Relationship: Self    Best call back number: [phone]    Requested Prescriptions:   Requested Prescriptions     Pending Prescriptions Disp Refills   • HYDROcodone-acetaminophen (Norco) 5-325 MG per tablet 20 tablet 0     Sig: Take 1 tablet by mouth Every 6 (Six) Hours As Needed for Moderate Pain.        Pharmacy where request should be sent: [id number] - [place], [place] - [address] [facility] AT [facility] [address] [phone] [facility] [phone] FX     Last office visit with prescribing clinician: 3/13/2023   Last telemedicine visit with prescribing clinician: 4/19/2023   Next office visit with prescribing clinician: 4/19/2023     Additional details provided by patient: PT WILL RUN OUT OF MEDICINE TODAY.     Does the patient have less than a 3 day supply:  [x] Yes  [] No    Would you like a call back once the refill request has been completed: [] Yes [x] No    If the office needs to give you a call back, can they leave a voicemail: [] Yes [x] No

## 2023-04-17 NOTE — TELEPHONE ENCOUNTER
Caller: HooksNiecy    Relationship: Self    Best call back number: [phone]    What is the best time to reach you: ANYTIME    Who are you requesting to speak with (clinical staff, provider, specific staff member): BREANNE RAMIRES MA    What was the call regarding: PLEASE FAX [facility] PAPERWORK AND THE DOCTORS NOTE -[phone]. PT ALSO NEEDS A COPY OF HER [facility] PAPERWORK SENT TO HER EMPLOYER AND THIS FAX # SHOULD HAVE BEEN LISTED ON HER [facility] PAPERWORK. SO FAX TO 2 DIFFERENT PLACES.     Do you require a callback: CAN CALL PT WITH ANY QUESTIONS.

## 2023-04-19 ENCOUNTER — OFFICE VISIT (OUTPATIENT)
Dept: GYNECOLOGIC ONCOLOGY | Facility: CLINIC | Age: 43
End: 2023-04-19
Payer: COMMERCIAL

## 2023-04-19 VITALS
TEMPERATURE: 98.7 F | RESPIRATION RATE: 12 BRPM | DIASTOLIC BLOOD PRESSURE: 76 MMHG | BODY MASS INDEX: 21.89 KG/M2 | WEIGHT: 144.4 LBS | HEIGHT: 68 IN | OXYGEN SATURATION: 99 % | SYSTOLIC BLOOD PRESSURE: 110 MMHG | HEART RATE: 78 BPM

## 2023-04-19 DIAGNOSIS — N80.9 ENDOMETRIOSIS: ICD-10-CM

## 2023-04-19 DIAGNOSIS — Z98.890 POST-OPERATIVE STATE: Primary | ICD-10-CM

## 2023-04-19 PROBLEM — N73.6 PELVIC ADHESIONS: Status: ACTIVE | Noted: 2023-04-19

## 2023-04-19 PROBLEM — N83.201 CYST OF RIGHT OVARY: Status: RESOLVED | Noted: 2023-03-08 | Resolved: 2023-04-19

## 2023-04-19 PROBLEM — N94.89 PELVIC CONGESTIVE SYNDROME: Status: RESOLVED | Noted: 2023-03-08 | Resolved: 2023-04-19

## 2023-04-19 PROBLEM — R10.2 PELVIC PAIN: Status: RESOLVED | Noted: 2023-03-08 | Resolved: 2023-04-19

## 2023-04-19 PROCEDURE — 99024 POSTOP FOLLOW-UP VISIT: CPT | Performed by: PHYSICIAN ASSISTANT

## 2023-04-19 RX ORDER — ACETAMINOPHEN 500 MG
500 TABLET ORAL EVERY 6 HOURS PRN
COMMUNITY
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
## 2023-04-21 RX ORDER — HYDROCODONE BITARTRATE AND ACETAMINOPHEN 5; 325 MG/1; MG/1
1 TABLET ORAL EVERY 6 HOURS PRN
Qty: 20 TABLET | Refills: 0 | Status: SHIPPED | OUTPATIENT
Start: 2023-04-21

## 2023-04-25 NOTE — PROGRESS NOTES
GYN Visit    Chief Complaint   Patient presents with   • Follow-up     F/U GYN/ONC        HPI:   42 y.o. LMP: Patient's last menstrual period was 2023 (exact date).     Social History     Substance and Sexual Activity   Sexual Activity Not Currently   • Partners: Male   • Birth control/protection: None    Comment:  overseas long term, [place] visits q 6mo       Tissue Pathology Exam (2023 15:07)   Op Note by Jerri Woodward DO (2023 08:32)   Tissue Pathology Exam (2023 10:49)   Progress Notes by Kimberly Joshua PA-C (2023 11:00)     Patient is status post robotic assisted total laparoscopic hysterectomy with bilateral salpingectomy and right oophorectomy, 2 hours of lysis of adhesions and bilateral ureterolysis by Dr. Jerri Woodward.  Pathology was benign, consistent with endometriosis.  Patient was seen on  by Kimberly Perez physician assistant and notes that patient would benefit from OCPs to suppress endometriosis and recommended follow-up with our office.    Feels much better since surgery.  Not complaining of any pain.  No vaginal bleeding.    She had wt gain w Depo Provera.  OCPS in past w varicose vv.  Liked the patch.     History: PMHx, Meds, Allergies, PSHx, Social Hx, and POBHx all reviewed and updated.    PHYSICAL EXAM:  /70   Wt 65.3 kg (144 lb)   LMP 2023 (Exact Date)   BMI 21.90 kg/m²   General- NAD, alert and oriented, appropriate  Psych- Normal mood, good memory      ASSESSMENT AND PLAN:  Diagnoses and all orders for this visit:    1. Endometriosis s/p robotic hyst USO, still has RIGHT ovary (Primary)  Overview:  2023 Dr. TREVA Woodward, gyn onc, 2 hours of lysis of adhesions, and ureterolysis    Orders:  -     norelgestromin-ethinyl estradiol (ORTHO EVRA) 150-35 MCG/24HR; Place 1 patch on the skin as directed by provider 1 (One) Time Per Week.  Dispense: 12 patch; Refill: 4    OCP/hormone use risk THROMBOEMBOLIC RISK reviewed.        ENDOMETRIOSIS- Dx, incidence, familial tendency, recurrence,   periods/pain/dyspareunia, pregnancy, risk of other pain syndromes. Tx options wrt pt hx NLT expectant, hormonal.    Follow Up:  Return in about 1 year (around 4/26/2024) for WWE.    I spent 20 minutes caring for Niecy on this date of service. This time includes time spent by me in the following activities:preparing for the visit, reviewing tests, obtaining and/or reviewing a separately obtained history, counseling and educating the patient/family/caregiver, ordering medications, tests, or procedures and documenting information in the medical record      Kimberly Rondon DO  04/26/2023    [facility] [place] [facility]  [facility] [place] [address]  Dept: [phone]  Dept Fax: [phone]  Loc: [phone]  Loc Fax: [phone]

## 2023-04-26 ENCOUNTER — OFFICE VISIT (OUTPATIENT)
Dept: OBSTETRICS AND GYNECOLOGY | Facility: CLINIC | Age: 43
End: 2023-04-26
Payer: COMMERCIAL

## 2023-04-26 VITALS — WEIGHT: 144 LBS | SYSTOLIC BLOOD PRESSURE: 106 MMHG | DIASTOLIC BLOOD PRESSURE: 70 MMHG | BODY MASS INDEX: 21.9 KG/M2

## 2023-04-26 DIAGNOSIS — N80.9 ENDOMETRIOSIS: Primary | ICD-10-CM

## 2023-05-18 ENCOUNTER — TELEPHONE (OUTPATIENT)
Dept: GYNECOLOGIC ONCOLOGY | Facility: CLINIC | Age: 43
End: 2023-05-18
Payer: COMMERCIAL

## 2023-05-18 NOTE — TELEPHONE ENCOUNTER
Caller: Niecy Hooks    Relationship: Self    Best call back number: [phone]    Who are you requesting to speak with (clinical staff, provider,  specific staff member):CLINICAL    What was the call regarding: PATIENT REQUESTING AN EXTENDED WORK EXCUSED ABSENCE    PATIENT CURRENTLY SUPPOSED TO RETURN Saturday 5/20/23    Do you require a callback: YES

## 2023-06-16 ENCOUNTER — TELEPHONE (OUTPATIENT)
Dept: OBSTETRICS AND GYNECOLOGY | Facility: CLINIC | Age: 43
End: 2023-06-16
Payer: COMMERCIAL

## 2023-06-16 NOTE — TELEPHONE ENCOUNTER
Patient called wanting to clarify the instructions for her Ortho-Evra script. Is she to wear a patch every week or wear for 3 weeks and then off 1 week? Please clarify.

## 2023-12-15 PROCEDURE — 87635 SARS-COV-2 COVID-19 AMP PRB: CPT

## 2024-05-09 ENCOUNTER — OFFICE VISIT (OUTPATIENT)
Dept: OBSTETRICS AND GYNECOLOGY | Facility: CLINIC | Age: 44
End: 2024-05-09
Payer: COMMERCIAL

## 2024-05-09 VITALS
DIASTOLIC BLOOD PRESSURE: 87 MMHG | HEART RATE: 66 BPM | SYSTOLIC BLOOD PRESSURE: 135 MMHG | HEIGHT: 69 IN | BODY MASS INDEX: 22.28 KG/M2 | WEIGHT: 150.4 LBS

## 2024-05-09 DIAGNOSIS — Q83.9 NIPPLE ANOMALY: ICD-10-CM

## 2024-05-09 DIAGNOSIS — Z12.11 COLON CANCER SCREENING: ICD-10-CM

## 2024-05-09 DIAGNOSIS — Z01.419 WELL WOMAN EXAM: Primary | ICD-10-CM

## 2024-05-09 PROCEDURE — 80061 LIPID PANEL: CPT | Performed by: OBSTETRICS & GYNECOLOGY

## 2024-05-09 PROCEDURE — 84443 ASSAY THYROID STIM HORMONE: CPT | Performed by: OBSTETRICS & GYNECOLOGY

## 2024-05-09 PROCEDURE — 83036 HEMOGLOBIN GLYCOSYLATED A1C: CPT | Performed by: OBSTETRICS & GYNECOLOGY

## 2024-05-09 RX ORDER — NORELGESTROMIN AND ETHINYL ESTRADIOL 35; 150 UG/MG; UG/MG
1 PATCH TRANSDERMAL WEEKLY
Qty: 3 PATCH | Refills: 12 | Status: SHIPPED | OUTPATIENT
Start: 2024-05-09 | End: 2025-05-09

## 2024-05-10 ENCOUNTER — TELEPHONE (OUTPATIENT)
Dept: GASTROENTEROLOGY | Facility: CLINIC | Age: 44
End: 2024-05-10
Payer: COMMERCIAL

## 2024-05-10 LAB
CHOLEST SERPL-MCNC: 163 MG/DL (ref 0–200)
HBA1C MFR BLD: 5.5 % (ref 4.8–5.6)
HDLC SERPL-MCNC: 57 MG/DL (ref 40–60)
LDLC SERPL CALC-MCNC: 89 MG/DL (ref 0–100)
LDLC/HDLC SERPL: 1.55 {RATIO}
TRIGL SERPL-MCNC: 89 MG/DL (ref 0–150)
TSH SERPL DL<=0.05 MIU/L-ACNC: 0.87 UIU/ML (ref 0.27–4.2)
VLDLC SERPL-MCNC: 17 MG/DL (ref 5–40)

## 2024-05-22 ENCOUNTER — TELEPHONE (OUTPATIENT)
Dept: OBSTETRICS AND GYNECOLOGY | Facility: CLINIC | Age: 44
End: 2024-05-22
Payer: COMMERCIAL

## 2024-05-22 NOTE — TELEPHONE ENCOUNTER
UNABLE TO CONTACT / TRIED 3 TIMES 5/14/24, 5/16/24 & 5/17/24 ( SEE REFERRAL ) / LETTER MAILED TO ADDRESS ON FILE / REFERRAL TO MAMMO CLOSED

## 2025-07-03 NOTE — PROGRESS NOTES
"Chief Complaint  Annual Exam    Subjective          Niecy Hooks presents to [facility]  History of Present Illness        History of Present Illness  The patient presents for an annual physical exam and fatigue.    She reports experiencing persistent fatigue, even though she gets adequate sleep. She has been juggling two jobs, one with a 12-hour shift and the other with a 10-hour shift, for the past two years. Despite discontinuing one of these jobs last month, her fatigue persists. She also mentions that she snores during sleep.    She is due for a mammogram. She has undergone a hysterectomy and retains only one ovary.    Supplemental Information  Additionally, she experiences occasional back pain.    SOCIAL HISTORY  She works at [employer].          No current outpatient medications      The following portions of the patient's history were reviewed and updated as appropriate: allergies, current medications, past family history, past medical history, past social history, past surgical history, and problem list.    Objective   Vital Signs:   /83 (BP Location: Left arm, Patient Position: Sitting, Cuff Size: Adult)   Pulse 85   Temp 97.9 °F (36.6 °C) (Temporal)   Ht 175.3 cm (69\")   Wt 72.1 kg (159 lb)   SpO2 98%   BMI 23.48 kg/m²     BP Readings from Last 3 Encounters:   07/09/25 126/83   06/07/25 123/81   03/29/25 117/77     Wt Readings from Last 3 Encounters:   07/09/25 72.1 kg (159 lb)   06/07/25 72 kg (158 lb 12.8 oz)   12/13/24 70.5 kg (155 lb 6.4 oz)     BMI is within normal parameters. No other follow-up for BMI required.     Physical Exam     Appearance: No acute distress, well-nourished  Head: normocephalic, atraumatic  Eyes: extraocular movements intact, no scleral icterus, no conjunctival injection  Ears, Nose, and Throat: external ears normal, nares patent, moist mucous membranes  Cardiovascular: regular rate and rhythm. no murmurs, " rubs, or gallops. no edema  Respiratory: breathing comfortably, symmetric chest rise, clear to auscultation bilaterally. No wheezes, rales, or rhonchi.  Neuro: alert and oriented to time, place, and person. Normal gait  Psych: normal mood and affect     Physical Exam  Chaperone: Chaperone present  Other: Blood pressure is normal. Weight is normal.      Result Review :   The following data was reviewed by: BERNARD Douglas on 07/09/2025:  Common labs          7/9/2025    15:39   Common Labs   Glucose 90    BUN 9.0    Creatinine 0.63    Sodium 137    Potassium 4.1    Chloride 99    Calcium 9.4    Albumin 4.4    Total Bilirubin 0.5    Alkaline Phosphatase 47    AST (SGOT) 19    ALT (SGPT) 8    WBC 6.13    Hemoglobin 14.3    Hematocrit 42.4    Platelets 303    Total Cholesterol 179    Triglycerides 111    HDL Cholesterol 50    LDL Cholesterol  109        Results           Lab Results   Component Value Date    SARSANTIGEN Not Detected 12/13/2024    COVID19 Detected (C) 12/15/2023    RAPFLUA Negative 02/18/2022    RAPFLUB Negative 02/18/2022    FLUAAG Not Detected 12/13/2024    FLUBAG Not Detected 12/13/2024    RAPSCRN Negative 12/15/2023    INR 1.03 03/28/2023    BILIRUBINUR Small (1+) (A) 03/29/2025            Assessment and Plan    Diagnoses and all orders for this visit:    1. Annual physical exam (Primary)  -     TSH Rfx On Abnormal To Free T4  -     Lipid Panel  -     Comprehensive Metabolic Panel  -     CBC & Differential    2. Screening for thyroid disorder  -     TSH Rfx On Abnormal To Free T4    3. Screening for lipid disorders  -     Lipid Panel    4. Encounter for screening mammogram for malignant neoplasm of breast  -     Mammo Screening Digital Tomosynthesis Bilateral With CAD; Future    5. Screening for colon cancer  -     Cologuard - Stool, Per Rectum; Future    6. Other fatigue  -     Iron  -     Vitamin B12  -     Vitamin D 1,25 dihydroxy    7. Excessive daytime sleepiness  -     Ambulatory  Referral to Sleep Medicine    8. Snoring  -     Ambulatory Referral to Sleep Medicine      Advised on diet, physical activity, sunscreen, helmet, texting and driving, etc    Assessment & Plan  1. Fatigue.  - Persistent fatigue despite adequate sleep reported.  - Physical exam findings indicate normal blood pressure and weight.  - Laboratory tests will be performed today to check for vitamin deficiencies or other underlying conditions; sleep study ordered to rule out sleep apnea.  - Referral to sleep medicine for sleep study; patient will be contacted with lab results.    2. Health maintenance.  - Blood pressure and weight are within normal ranges.  - Mammogram is due; order placed for mammogram, patient will receive a call to schedule.  - Cologuard test for colon cancer screening discussed and agreed upon; kit will be mailed to patient's home.  - Counseling provided on the importance of regular screenings and follow-ups.    Medications Discontinued During This Encounter   Medication Reason    meclizine (ANTIVERT) 25 MG tablet     fluticasone (FLONASE) 50 MCG/ACT nasal spray     cetirizine (zyrTEC) 10 MG tablet     norelgestromin-ethinyl estradiol (ORTHO EVRA) 150-35 MCG/24HR           Follow Up   Return in about 1 year (around 7/9/2026) for Annual physical.  Patient was given instructions and counseling regarding her condition or for health maintenance advice. Please see specific information pulled into the AVS if appropriate.       BERNARD Douglas  07/17/25  10:14 EDT      Patient or patient representative verbalized consent for the use of Ambient Listening during the visit with  BERNARD Douglas for chart documentation. 7/17/2025  15:17 EDT

## 2025-07-09 ENCOUNTER — OFFICE VISIT (OUTPATIENT)
Dept: INTERNAL MEDICINE | Facility: CLINIC | Age: 45
End: 2025-07-09
Payer: COMMERCIAL

## 2025-07-09 VITALS
BODY MASS INDEX: 23.55 KG/M2 | WEIGHT: 159 LBS | DIASTOLIC BLOOD PRESSURE: 83 MMHG | TEMPERATURE: 97.9 F | OXYGEN SATURATION: 98 % | SYSTOLIC BLOOD PRESSURE: 126 MMHG | HEIGHT: 69 IN | HEART RATE: 85 BPM

## 2025-07-09 DIAGNOSIS — G47.19 EXCESSIVE DAYTIME SLEEPINESS: ICD-10-CM

## 2025-07-09 DIAGNOSIS — R53.83 OTHER FATIGUE: ICD-10-CM

## 2025-07-09 DIAGNOSIS — Z12.11 SCREENING FOR COLON CANCER: ICD-10-CM

## 2025-07-09 DIAGNOSIS — R06.83 SNORING: ICD-10-CM

## 2025-07-09 DIAGNOSIS — Z13.29 SCREENING FOR THYROID DISORDER: ICD-10-CM

## 2025-07-09 DIAGNOSIS — Z00.00 ANNUAL PHYSICAL EXAM: Primary | ICD-10-CM

## 2025-07-09 DIAGNOSIS — Z12.31 ENCOUNTER FOR SCREENING MAMMOGRAM FOR MALIGNANT NEOPLASM OF BREAST: ICD-10-CM

## 2025-07-09 DIAGNOSIS — Z13.220 SCREENING FOR LIPID DISORDERS: ICD-10-CM

## 2025-07-09 LAB
ALBUMIN SERPL-MCNC: 4.4 G/DL (ref 3.5–5.2)
ALBUMIN/GLOB SERPL: 1.5 G/DL
ALP SERPL-CCNC: 47 U/L (ref 39–117)
ALT SERPL W P-5'-P-CCNC: 8 U/L (ref 1–33)
ANION GAP SERPL CALCULATED.3IONS-SCNC: 13 MMOL/L (ref 5–15)
AST SERPL-CCNC: 19 U/L (ref 1–32)
BASOPHILS # BLD AUTO: 0.06 10*3/MM3 (ref 0–0.2)
BASOPHILS NFR BLD AUTO: 1 % (ref 0–1.5)
BILIRUB SERPL-MCNC: 0.5 MG/DL (ref 0–1.2)
BUN SERPL-MCNC: 9 MG/DL (ref 6–20)
BUN/CREAT SERPL: 14.3 (ref 7–25)
CALCIUM SPEC-SCNC: 9.4 MG/DL (ref 8.6–10.5)
CHLORIDE SERPL-SCNC: 99 MMOL/L (ref 98–107)
CHOLEST SERPL-MCNC: 179 MG/DL (ref 0–200)
CO2 SERPL-SCNC: 25 MMOL/L (ref 22–29)
CREAT SERPL-MCNC: 0.63 MG/DL (ref 0.57–1)
DEPRECATED RDW RBC AUTO: 40.2 FL (ref 37–54)
EGFRCR SERPLBLD CKD-EPI 2021: 111 ML/MIN/1.73
EOSINOPHIL # BLD AUTO: 0.79 10*3/MM3 (ref 0–0.4)
EOSINOPHIL NFR BLD AUTO: 12.9 % (ref 0.3–6.2)
ERYTHROCYTE [DISTWIDTH] IN BLOOD BY AUTOMATED COUNT: 11.6 % (ref 12.3–15.4)
GLOBULIN UR ELPH-MCNC: 2.9 GM/DL
GLUCOSE SERPL-MCNC: 90 MG/DL (ref 65–99)
HCT VFR BLD AUTO: 42.4 % (ref 34–46.6)
HDLC SERPL-MCNC: 50 MG/DL (ref 40–60)
HGB BLD-MCNC: 14.3 G/DL (ref 12–15.9)
IMM GRANULOCYTES # BLD AUTO: 0.02 10*3/MM3 (ref 0–0.05)
IMM GRANULOCYTES NFR BLD AUTO: 0.3 % (ref 0–0.5)
IRON 24H UR-MRATE: 104 MCG/DL (ref 37–145)
LDLC SERPL CALC-MCNC: 109 MG/DL (ref 0–100)
LDLC/HDLC SERPL: 2.14 {RATIO}
LYMPHOCYTES # BLD AUTO: 1.31 10*3/MM3 (ref 0.7–3.1)
LYMPHOCYTES NFR BLD AUTO: 21.4 % (ref 19.6–45.3)
MCH RBC QN AUTO: 31.8 PG (ref 26.6–33)
MCHC RBC AUTO-ENTMCNC: 33.7 G/DL (ref 31.5–35.7)
MCV RBC AUTO: 94.4 FL (ref 79–97)
MONOCYTES # BLD AUTO: 0.36 10*3/MM3 (ref 0.1–0.9)
MONOCYTES NFR BLD AUTO: 5.9 % (ref 5–12)
NEUTROPHILS NFR BLD AUTO: 3.59 10*3/MM3 (ref 1.7–7)
NEUTROPHILS NFR BLD AUTO: 58.5 % (ref 42.7–76)
NRBC BLD AUTO-RTO: 0 /100 WBC (ref 0–0.2)
PLATELET # BLD AUTO: 303 10*3/MM3 (ref 140–450)
PMV BLD AUTO: 10 FL (ref 6–12)
POTASSIUM SERPL-SCNC: 4.1 MMOL/L (ref 3.5–5.2)
PROT SERPL-MCNC: 7.3 G/DL (ref 6–8.5)
RBC # BLD AUTO: 4.49 10*6/MM3 (ref 3.77–5.28)
SODIUM SERPL-SCNC: 137 MMOL/L (ref 136–145)
TRIGL SERPL-MCNC: 111 MG/DL (ref 0–150)
TSH SERPL DL<=0.05 MIU/L-ACNC: 0.8 UIU/ML (ref 0.27–4.2)
VIT B12 BLD-MCNC: 493 PG/ML (ref 211–946)
VLDLC SERPL-MCNC: 20 MG/DL (ref 5–40)
WBC NRBC COR # BLD AUTO: 6.13 10*3/MM3 (ref 3.4–10.8)

## 2025-07-09 PROCEDURE — 83540 ASSAY OF IRON: CPT | Performed by: NURSE PRACTITIONER

## 2025-07-09 PROCEDURE — 80050 GENERAL HEALTH PANEL: CPT | Performed by: NURSE PRACTITIONER

## 2025-07-09 PROCEDURE — 82607 VITAMIN B-12: CPT | Performed by: NURSE PRACTITIONER

## 2025-07-09 PROCEDURE — 82652 VIT D 1 25-DIHYDROXY: CPT | Performed by: NURSE PRACTITIONER

## 2025-07-09 PROCEDURE — 80061 LIPID PANEL: CPT | Performed by: NURSE PRACTITIONER

## 2025-07-14 LAB — 1,25(OH)2D SERPL-MCNC: 65 PG/ML (ref 24.8–81.5)

## 2025-07-15 ENCOUNTER — PATIENT ROUNDING (BHMG ONLY) (OUTPATIENT)
Dept: INTERNAL MEDICINE | Facility: CLINIC | Age: 45
End: 2025-07-15
Payer: COMMERCIAL

## 2025-07-15 ENCOUNTER — RESULTS FOLLOW-UP (OUTPATIENT)
Dept: INTERNAL MEDICINE | Facility: CLINIC | Age: 45
End: 2025-07-15
Payer: COMMERCIAL

## 2025-07-15 NOTE — PROGRESS NOTES
July 15, 2025    Hello, may I speak with [place]?    My name is Mela      I am  with [facility]  [address]  [place] [address] [phone].    Before we get started may I verify your date of birth? 1980    I am calling to officially welcome you to our practice and ask about your recent visit. Is this a good time to talk? My chart message sent for patient rounding.

## (undated) DEVICE — GLV SURG BIOGEL LTX PF 5 1/2

## (undated) DEVICE — ARM DRAPE

## (undated) DEVICE — COLUMN DRAPE

## (undated) DEVICE — SUT VIC 0/0 UR6 27IN DYED J603H

## (undated) DEVICE — SUT PDS 0 CT1 36IN Z346H

## (undated) DEVICE — 2, DISPOSABLE SUCTION/IRRIGATOR WITH DISPOSABLE TIP: Brand: STRYKEFLOW

## (undated) DEVICE — PATIENT RETURN ELECTRODE, SINGLE-USE, CONTACT QUALITY MONITORING, ADULT, WITH 9FT CORD, FOR PATIENTS WEIGING OVER 33LBS. (15KG): Brand: MEGADYNE

## (undated) DEVICE — SYR LL TP 10ML STRL

## (undated) DEVICE — IRRIGATOR BULB ASEPTO 60CC STRL

## (undated) DEVICE — TOWEL,OR,DSP,ST,BLUE,STD,4/PK,20PK/CS: Brand: MEDLINE

## (undated) DEVICE — ENDOPATH XCEL UNIVERSAL TROCAR STABLILITY SLEEVES: Brand: ENDOPATH XCEL

## (undated) DEVICE — SOL ANTISTICK CAUTRY ELECTROLUBE LF

## (undated) DEVICE — DRSNG WND BORDR/ADHS NONADHR/GZ LF 2X2IN STRL

## (undated) DEVICE — TIP COVER ACCESSORY

## (undated) DEVICE — THE STERILE LIGHT HANDLE COVER IS USED WITH STERIS SURGICAL LIGHTING AND VISUALIZATION SYSTEMS.

## (undated) DEVICE — LOU LITHOTOMY ROBOTIC: Brand: MEDLINE INDUSTRIES, INC.

## (undated) DEVICE — GOWN,SIRUS,NON REINFRCD,LARGE,SET IN SL: Brand: MEDLINE

## (undated) DEVICE — ADEPT (4% ICODEXTRIN SOLUTION) IS A SINGLE USE, STERILE, CLEAR, COLORLESS TO PALE YELLOWFLUID FOR INTRAPERITONEAL ADMINISTRATION, CONTAINING ICODEXTRIN AT A CONCENTRATION OF 4%W/V IN AN ELECTROLYTE SOLUTION, PRESENTED IN A FLEXIBLE POLYVINYLCHLORIDE BAG.ADEPT IS INDICATED FOR USE INTRAPERITONEALLY AS AN ADJUNCT TO GOOD SURGICAL TECHNIQUE FORTHE REDUCTION OF POST-SURGICAL ADHESIONS IN PATIENTS UNDERGOING GYNECOLOGICALLAPAROSCOPIC ADHESIOLYSIS.: Brand: ADEPT ADHESION REDUCTION SOLUTION

## (undated) DEVICE — CANNULA SEAL

## (undated) DEVICE — DRAPE,UNDERBUTTOCKS,PCH,STERILE: Brand: MEDLINE

## (undated) DEVICE — GLV SURG SENSICARE POLYISPRN W/ALOE PF LF 6.5 GRN STRL

## (undated) DEVICE — GLV SURG SENSICARE PI PF LF 7 GRN STRL

## (undated) DEVICE — ANTIBACTERIAL UNDYED BRAIDED (POLYGLACTIN 910), SYNTHETIC ABSORBABLE SUTURE: Brand: COATED VICRYL

## (undated) DEVICE — ADHS LIQ MASTISOL 2/3ML

## (undated) DEVICE — MANIP UTER ELEVATOR/PRO W/LNG/HNDL CERV/CUP 35MM MD

## (undated) DEVICE — LAPAROVUE VISIBILITY SYSTEM LAPAROSCOPIC SOLUTIONS: Brand: LAPAROVUE

## (undated) DEVICE — STRIP,CLOSURE,WOUND,MEDI-STRIP,1/2X4: Brand: MEDLINE

## (undated) DEVICE — DRAPE,CHEST,FENES,15X10,STERIL: Brand: MEDLINE

## (undated) DEVICE — GLV SURG BIOGEL LTX PF 6 1/2

## (undated) DEVICE — BLADELESS OBTURATOR: Brand: WECK VISTA

## (undated) DEVICE — CATHETER,FOLEY,100%SILICONE,16FR,10ML,LF: Brand: MEDLINE

## (undated) DEVICE — SOL NACL 0.9PCT 1000ML

## (undated) DEVICE — SKIN PREP TRAY W/CHG: Brand: MEDLINE INDUSTRIES, INC.

## (undated) DEVICE — ENDOPATH XCEL BLADELESS TROCARS WITH STABILITY SLEEVES: Brand: ENDOPATH XCEL